# Patient Record
Sex: FEMALE | Race: WHITE | NOT HISPANIC OR LATINO | Employment: FULL TIME | URBAN - METROPOLITAN AREA
[De-identification: names, ages, dates, MRNs, and addresses within clinical notes are randomized per-mention and may not be internally consistent; named-entity substitution may affect disease eponyms.]

---

## 2017-02-20 ENCOUNTER — OFFICE VISIT (OUTPATIENT)
Dept: PALLIATIVE MEDICINE | Facility: CLINIC | Age: 42
End: 2017-02-20
Payer: COMMERCIAL

## 2017-02-20 VITALS
SYSTOLIC BLOOD PRESSURE: 103 MMHG | DIASTOLIC BLOOD PRESSURE: 55 MMHG | WEIGHT: 154 LBS | HEART RATE: 77 BPM | BODY MASS INDEX: 26.85 KG/M2

## 2017-02-20 DIAGNOSIS — M54.2 CERVICALGIA: ICD-10-CM

## 2017-02-20 DIAGNOSIS — G43.719 INTRACTABLE CHRONIC MIGRAINE WITHOUT AURA AND WITHOUT STATUS MIGRAINOSUS: Primary | ICD-10-CM

## 2017-02-20 PROCEDURE — 99204 OFFICE O/P NEW MOD 45 MIN: CPT | Performed by: PSYCHIATRY & NEUROLOGY

## 2017-02-20 RX ORDER — TOPIRAMATE 100 MG/1
150 TABLET, FILM COATED ORAL 2 TIMES DAILY
Qty: 90 TABLET | Refills: 3 | Status: SHIPPED | OUTPATIENT
Start: 2017-02-20 | End: 2017-06-28

## 2017-02-20 RX ORDER — BUTALBITAL, ACETAMINOPHEN AND CAFFEINE 50; 325; 40 MG/1; MG/1; MG/1
1 CAPSULE ORAL EVERY 4 HOURS PRN
Qty: 60 CAPSULE | COMMUNITY
Start: 2017-02-20 | End: 2017-02-23

## 2017-02-20 ASSESSMENT — PAIN SCALES - GENERAL: PAINLEVEL: MILD PAIN (2)

## 2017-02-20 NOTE — PATIENT INSTRUCTIONS
1. Try the compazine as an abortive medication.  This is an antinausea medication.  When you are out and worried about sedation, continue to use your ondansetron (zofran).  When you are able to be more sleepy, try the compazine.  Do not use these together.  Put at least 6 hours between doses.    2. Continue the gabapentin 600mg 3 times daily  3. Increase the topamax as follows:  AM  PM  100mg  150mg (1.5 of the 100mg tabs).  After 1 week, if tolerating, increase to next line  150mg  150mg.  After 2 weeks, call or [employer] with an update.    -remain well hydrated, due to risk of kidney stones  -do not drive until you know how it affects you  -new numbness or tingling in the toes may be related to how well hydrated you are- if this occurs- drink more fluids and it should get better     4. You can continue the fiorecet and the Norco, but the plan would be to try and use less with time.  5. Schedule pain PT and pain psychology-  or [phone]  6. I will look into botox again.  7. Schedule follow up in 2 months, but we will be talking when you call about topamax increase  8. Keep a headache diary- with meds  9. Call [phone] if you need to be scheduled for occipital nerve block (double booked)    Nurse Triage line:  [phone]   Call this number with any questions or concerns. You may leave a detailed message anytime. Calls are typically returned Monday through Friday between 8 AM and 4:30 PM. We usually get back to you within 2 business days depending on the issue/request.       Medication refills:    For non-narcotic medications, call your pharmacy directly to request a refill. The pharmacy will contact the [facility] for authorization. Please allow 3-4 days for these refills to be processed.     For narcotic refills, call the nurse triage line or send a [employer] message. Please contact us 7-10 days before your refill is due. The message MUST include the name of the specific medication(s)  requested and how you would like to receive the prescription(s). The options are as follows:    Pain Clinic staff can mail the prescription to your pharmacy. Please tell us the name of the pharmacy.    You may pick the prescription up at the Pain Clinic (tell us the location) or during a clinic visit with your pain provider    Pain Clinic staff can deliver the prescription to the [place] pharmacy in the clinic building. Please tell us the location.      Scheduling number: [phone].  Call this number to schedule or change appointments.    We believe regular attendance is key to your success in our program.    Any time you are unable to keep your appointment we ask that you call us at least 24 hours in advance to let us know. This will allow us to offer the appointment time to another patient.

## 2017-02-20 NOTE — NURSING NOTE
"    No chief complaint on file.      Initial Wt 69.9 kg (154 lb)  BMI 26.85 kg/m2 Estimated body mass index is 26.85 kg/(m^2) as calculated from the following:    Height as of 8/10/16: 1.613 m (5' 3.5\").    Weight as of this encounter: 69.9 kg (154 lb).  Medication Reconciliation: complete     Marie Hernandez CMA (AAMA)      "

## 2017-02-20 NOTE — PROGRESS NOTES
[facility] Consultation    Date of visit: 2/20/2017    Reason for consultation:    Gayle Castelan is a 41 year old female who is seen in consultation today at the request of her provider, Dr. Chavez    Primary Care Provider is Clinic, [facility].  Pain medications are being prescribed by Dr. Starkey.    Please see the [facility] health questionnaire which the patient completed and reviewed with me in detail.    Chief Complaint:    Chief Complaint   Patient presents with     Pain       Pain history:  Gayle Castelan is a 41 year old female who first started having problems with headaches when she was 17yo. There was no preceding trauma. The patient has seen Dr. Hoskins in the past for one visit for headaches but currently follows up with Dr. Chavez, neurologist.     She has a daily headache, which has improved since starting on gabapentin and topamax, with the topamax being most helpful.   She has a second type of headahe, which became more frequent in 2015 1-2 months after a divorce. She had them about 2x per week. The headaches are in the bilateral temporal regions. They had a sharp, shooting quality with associated nausea, vomiting, photophobia, phonophobia. Certain smells could trigger them as well as bright sunlight and lights. She reports yawning a lot before a headache comes on. When a headache would come on, it could last the entire day and into the next day, sometimes as long as 3-4 days. Since she got put on a steroid taper recently she would have a migraine headache every 2 weeks lasting 2-3 days.      She also has neck pain at times.    Of note, she had a mild concussion in Sept. 2106 after walking into a wall. She presented to ED and reported that she didn't lose consciousness. She had a CT of the head which was negative.    Pain rating: intensity ranges from 0/10 to 8/10, and Averages 4/10 on a 0-10 scale.  Aggravating factors include:  Bright lights, certain scents. Noise and lights.  Relieving factors include: compression headband, ice pack; cool, dark, quiet place; essential oils, physical activity, biofeedback, massage. Deep breathing, stretching.  Any bowel or bladder incontinence: none.  Denied weakness, problems with vision or hearing, numbness/tingling, balance problems, fevers/chills, unintentional weight loss, cognitive problems.        Current treatments include:  Gabapentin 600mg TID  Topamax 100mg BID  Fiorecet- takes 1 at a time. Taking about 2-3/week. Works well to abort headache.    Hydrocodone- last prescribed #30 tabs on 7/27/16- generally will take 1/month for headache    Previous medication treatments included:  Nortriptyline 50mg- not helpful, difficult to wake up  Codeine- nausea  No long-acting narcotics  Advil nh  Aleve NH  Amerge- nervous/shaking  Imitrex- stopped working  Maxalt 5mg PRN  Prednisone burst- helpful for flares- prescribed PRN     Other treatments have included:  Gayle Castelan has not been seen at a pain clinic in the past.   PT: Tried, massage was helpful  Relaxation techniques/biofeedback: none formal  Chiropractor: none  Acupuncture: none  TENs Unit: none  Injections: none    Past Medical History:  No past medical history on file.   Bleeding unrelated to menstrual cycle  depression    Past Surgical History:  Past Surgical History   Procedure Laterality Date     Abdomen surgery       Procedure other - him scan       ESURE Coil     Proctor teeth       Cosmetic mammoplasty augmentation bilateral       Medications:  Current Outpatient Prescriptions   Medication Sig Dispense Refill     Multiple Vitamins-Minerals (MULTIVITAMIN ADULT PO)        butalbital-acetaminophen-caffeine (FIORICET/ESGIC) -40 MG CAPS per capsule Take 1 capsule by mouth every 4 hours as needed for headaches 60 capsule      topiramate (TOPAMAX) 100 MG tablet Take 1.5 tablets (150 mg) by mouth 2 times daily . Increase to this dose as  instructed in clinic. 90 tablet 3     gabapentin (NEURONTIN) 300 MG capsule Take 2 capsules (600 mg) by mouth 3 times daily 180 capsule 5     MAGNESIUM OXIDE PO Take 400 mg by mouth daily       Coenzyme Q10 (CO Q 10 PO) Take 1 tablet by mouth daily       LORazepam (ATIVAN) 1 MG tablet Take 0.5-1 tablets (0.5-1 mg) by mouth every 8 hours as needed for anxiety 30 tablet 1     HYDROcodone-acetaminophen (NORCO) 5-325 MG per tablet 1 to 2 tablets every 4 hours as needed for migraine       HYDROcodone-acetaminophen (NORCO) 5-325 MG per tablet Take 1-2 tablets by mouth every 4 hours as needed for moderate to severe pain 10 tablet 0     topiramate (TOPAMAX) 25 MG tablet Take 8 tablets (200 mg) by mouth 2 times daily Continue to increase this medication by 25mg increments every 1-2 weeks as tolerated up to max of 400mg/day. (Patient taking differently: Take 100 mg by mouth 2 times daily ) 180 tablet 3     Ondansetron HCl (ZOFRAN PO) Take by mouth as needed for nausea or vomiting       Citalopram Hydrobromide (CELEXA PO) Take 20 mg by mouth every evening        albuterol (ALBUTEROL) 108 (90 BASE) MCG/ACT inhaler Inhale 2 puffs into the lungs every 4 hours as needed for shortness of breath / dyspnea 1 Inhaler 0     Allergies:     Allergies   Allergen Reactions     Promethazine      Other reaction(s): Dystonia     Codeine Sulfate Nausea     Social History:  Home situation: has two kids around 10 years old.    Occupation/Schooling: dental hygienist  Tobacco use: none  Alcohol use: none  Drug use: none  History of chemical dependency treatment: none    Family history:  Family History   Problem Relation Age of Onset     Family history unknown: Yes     Family history of headaches: adopted so doesn't know    Review of Systems:  Skin: negative  Eyes: negative  Ears/Nose/Throat: negative  Respiratory: Negative  Cardiovascular: negative  Gastrointestinal: negative  Genitourinary: negative  Musculoskeletal: negative  Neurologic:  headaches  Psychiatric: negative  Hematologic/Lymphatic/Immunologic: negative  Endocrine: negative    Physical Exam:  Vitals:    02/20/17 0849   BP: 103/55   Pulse: 77   Weight: 69.9 kg (154 lb)     Exam:  Constitutional: healthy, alert and no distress  Head: normocephalic. Atraumatic.   Eyes: no redness or jaundice noted   ENT:  Neck supple.    Respiratory: normal respiratory rate  GI: soft  : deferred  Skin: no suspicious lesions or rashes  Psychiatric: mentation appears normal and affect normal/bright    Musculoskeletal exam:  Gait/Station/Posture: normal  Cervical spine:    Flex:  45 degrees   Ext: 45 degrees   Rotation to right: 45 degrees   Rotation to left: 45 degrees    Myofascial tenderness:  no      Neurologic exam:  CN:  Cranial nerves 2-12 are normal  Motor:  5/5 UE and LE strength  Reflexes:     Biceps:     R:  2/4 L: 2/4   Brachioradialis   R:  2/4 L: 2/4   Triceps:  R:  2/4 L: 2/4   Patella:  R:  2/4 L: 2/4   Achilles:  R:  2/4 L: 2/4  Sensory    Diagnostic tests:  CT SCAN OF THE HEAD WITHOUT CONTRAST 9/25/2016       IMPRESSION: Normal CT scan of the head.       Screening tools:  DIRE Score for ongoing opioid management is calculated as follows:    Diagnosis = 1    Intractability =2    Risk: Psych =2  Chem Hlth = 3  Reliability =3  Social = 2    Efficacy = 2    Total DIRE Score = 15 (14 or higher predicts good candidate for ongoing opioid management; 13 or lower predicts poor candidate for opioid management)     Assessment:  1. Chronic migraine headache without aura  2. Psychosocial stressors      Gayle Castelan is a 41 year old female who presents with the complaints of a daily headache and an intermittent one that is most consistent with a migraine headache without an aura. She has taken an active, multimodal approach to dealing with her headaches with topamax and gabepentin being particularly effective at decreasing the frequency of both of her headaches. She does not have any neurologic or system  symptoms to suggest a secondary headache.    Plan:  Diagnosis reviewed, treatment option addressed, and risk/benefits discussed.  Self-care instructions given.  I am recommending a multidisciplinary treatment plan to help this patient better manage her pain.      1. Physical Therapy: Consult for pain PT.  2. Pain Psychologist to address issues of relaxation, behavioral change, coping style, and other factors important to improvement: yes  3. Diagnostic Studies: asked her to keep headache diary  4. Medication Management:   1. Continue gabapentin as currently prescribed  2. Increase topamax to 150 mg BID.   3. Consider compazine as an abortive.  Need to contact patient re: side effects from phenergan.  5. Further procedures recommended:   1. Discussed ONB as an abortive treatment. She can call if she wants occipital nerve block- can be double booked.   2. Consider botox in future.   3. No changes not fiorecet or Norco at this time- can continue to get from original prescriber.  Discussed avoiding overuse, but her use is very limited.  6. Acupuncture: None  7. Urine toxicology screen today: None   8. Recommendations/follow-up for PCP:  none.  9. Release of information: none  10. Follow up: 2 months    I saw and examined the patient with the Pain Fellow/Resident. I have reviewed and agree with the resident's note and plan of care and made changes and corrections directly to the body of the note.    TIME SPENT:  BY FELLOW/RESIDENT ALONE 25 MIN  BY MYSELF AND FELLOW/RESIDENT TOGETHER 25 MIN  BY MYSELF WITHOUT THE FELLOW/RESIDENT 20 MIN    These times included 25 minutes I spent counseling her about her diagnosis and treatment options and coordination of care with the primary team    Tamra Rojas MD  [facility]

## 2017-02-20 NOTE — MR AVS SNAPSHOT
After Visit Summary   2/20/2017    Gayle Castelan    MRN: [id number]           Patient Information     Date Of Birth          1975        Visit Information        Provider Department      2/20/2017 9:00 AM Aurora Rojas MD [facility]        Today's Diagnoses     Intractable chronic migraine without aura and without status migrainosus    -  1      Care Instructions      1. Try the compazine as an abortive medication.  This is an antinausea medication.  When you are out and worried about sedation, continue to use your ondansetron (zofran).  When you are able to be more sleepy, try the compazine.  Do not use these together.  Put at least 6 hours between doses.    2. Continue the gabapentin 600mg 3 times daily  3. Increase the topamax as follows:  AM  PM  100mg  150mg (1.5 of the 100mg tabs).  After 1 week, if tolerating, increase to next line  150mg  150mg.  After 2 weeks, call or mychart with an update.    -remain well hydrated, due to risk of kidney stones  -do not drive until you know how it affects you  -new numbness or tingling in the toes may be related to how well hydrated you are- if this occurs- drink more fluids and it should get better     4. You can continue the fiorecet and the Norco, but the plan would be to try and use less with time.  5. Schedule pain PT and pain psychology-  or [phone]  6. I will look into botox again.  7. Schedule follow up in 2 months, but we will be talking when you call about topamax increase  8. Keep a headache diary- with meds  9. Call [phone] if you need to be scheduled for occipital nerve block (double booked)    Nurse Triage line:  [phone]   Call this number with any questions or concerns. You may leave a detailed message anytime. Calls are typically returned Monday through Friday between 8 AM and 4:30 PM. We usually get back to you within 2 business days depending on the issue/request.       Medication  refills:    For non-narcotic medications, call your pharmacy directly to request a refill. The pharmacy will contact the [facility] for authorization. Please allow 3-4 days for these refills to be processed.     For narcotic refills, call the nurse triage line or send a [employer] message. Please contact us 7-10 days before your refill is due. The message MUST include the name of the specific medication(s) requested and how you would like to receive the prescription(s). The options are as follows:    Pain Clinic staff can mail the prescription to your pharmacy. Please tell us the name of the pharmacy.    You may pick the prescription up at the Pain Clinic (tell us the location) or during a clinic visit with your pain provider    Pain Clinic staff can deliver the prescription to the [place] pharmacy in the clinic building. Please tell us the location.      Scheduling number: [phone].  Call this number to schedule or change appointments.    We believe regular attendance is key to your success in our program.    Any time you are unable to keep your appointment we ask that you call us at least 24 hours in advance to let us know. This will allow us to offer the appointment time to another patient.             Follow-ups after your visit        Your next 10 appointments already scheduled     Feb 23, 2017  9:30 AM CST   [facility] Visit with [facility] Chavez MD   [facility]    [address] [address] [phone]              Who to contact     If you have questions or need follow up information about today's clinic visit or your schedule please contact [facility] directly at [phone].  Normal or non-critical lab and imaging results will be communicated to you by MyChart, letter or phone within 4 business days after the clinic has received the results. If you do not hear from us within 7 days, please contact the clinic through  [employer] or phone. If you have a critical or abnormal lab result, we will notify you by phone as soon as possible.  Submit refill requests through [employer] or call your pharmacy and they will forward the refill request to us. Please allow 3 business days for your refill to be completed.          Additional Information About Your Visit        [employer] Information     [employer] gives you secure access to your electronic health record. If you see a primary care provider, you can also send messages to your care team and make appointments. If you have questions, please call your primary care clinic.  If you do not have a primary care provider, please call [phone] and they will assist you.        Care EveryWhere ID     This is your Care EveryWhere ID. This could be used by other organizations to access your [place] medical records  [id number]        Your Vitals Were     Pulse BMI (Body Mass Index)                77 26.85 kg/m2           Blood Pressure from Last 3 Encounters:   02/20/17 103/55   11/29/16 97/55   09/25/16 104/65    Weight from Last 3 Encounters:   02/20/17 69.9 kg (154 lb)   11/29/16 69.3 kg (152 lb 12.8 oz)   08/10/16 67.6 kg (149 lb)              Today, you had the following     No orders found for display         Today's Medication Changes          These changes are accurate as of: 2/20/17 10:13 AM.  If you have any questions, ask your nurse or doctor.               These medicines have changed or have updated prescriptions.        Dose/Directions    * topiramate 25 MG tablet   Commonly known as:  TOPAMAX   This may have changed:    - how much to take  - additional instructions   Used for:  Migraine without aura and without status migrainosus, not intractable   Changed by:  Gallito Hoskins MD        Dose:  200 mg   Take 8 tablets (200 mg) by mouth 2 times daily Continue to increase this medication by 25mg increments every 1-2 weeks as tolerated up to max of 400mg/day.   Quantity:  180 tablet    Refills:  3       * topiramate 100 MG tablet   Commonly known as:  TOPAMAX   This may have changed:  You were already taking a medication with the same name, and this prescription was added. Make sure you understand how and when to take each.   Used for:  Intractable chronic migraine without aura and without status migrainosus   Changed by:  Aurora Rojas MD        Dose:  150 mg   Take 1.5 tablets (150 mg) by mouth 2 times daily . Increase to this dose as instructed in clinic.   Quantity:  90 tablet   Refills:  3       * Notice:  This list has 2 medication(s) that are the same as other medications prescribed for you. Read the directions carefully, and ask your doctor or other care provider to review them with you.      Stop taking these medicines if you haven't already. Please contact your care team if you have questions.     rizatriptan 5 MG tablet   Commonly known as:  MAXALT   Stopped by:  Aurora Rojas MD                Where to get your medicines      These medications were sent to [place] [address] [place] [address] [address]     Phone:  [phone]     topiramate 100 MG tablet                Primary Care Provider Office Phone # Fax #    [facility] [phone]       [address] [place] [address]        Thank you!     Thank you for choosing [facility]  for your care. Our goal is always to provide you with excellent care. Hearing back from our patients is one way we can continue to improve our services. Please take a few minutes to complete the written survey that you may receive in the mail after your visit with us. Thank you!             Your Updated Medication List - Protect others around you: Learn how to safely use, store and throw away your medicines at www.disposemymeds.org.          This list is accurate as of: 2/20/17 10:13 AM.  Always use your most recent med list.                    Brand Name Dispense Instructions for use    albuterol 108 (90 BASE) MCG/ACT Inhaler    albuterol    1 Inhaler    Inhale 2 puffs into the lungs every 4 hours as needed for shortness of breath / dyspnea       butalbital-acetaminophen-caffeine -40 MG Caps per capsule    FIORICET/ESGIC    60 capsule    Take 1 capsule by mouth every 4 hours as needed for headaches       CELEXA PO      Take 20 mg by mouth every evening       CO Q 10 PO      Take 1 tablet by mouth daily       gabapentin 300 MG capsule    NEURONTIN    180 capsule    Take 2 capsules (600 mg) by mouth 3 times daily       * HYDROcodone-acetaminophen 5-325 MG per tablet    NORCO     1 to 2 tablets every 4 hours as needed for migraine       * HYDROcodone-acetaminophen 5-325 MG per tablet    NORCO    10 tablet    Take 1-2 tablets by mouth every 4 hours as needed for moderate to severe pain       LORazepam 1 MG tablet    ATIVAN    30 tablet    Take 0.5-1 tablets (0.5-1 mg) by mouth every 8 hours as needed for anxiety       MAGNESIUM OXIDE PO      Take 400 mg by mouth daily       MULTIVITAMIN ADULT PO          * topiramate 25 MG tablet    TOPAMAX    180 tablet    Take 8 tablets (200 mg) by mouth 2 times daily Continue to increase this medication by 25mg increments every 1-2 weeks as tolerated up to max of 400mg/day.       * topiramate 100 MG tablet    TOPAMAX    90 tablet    Take 1.5 tablets (150 mg) by mouth 2 times daily . Increase to this dose as instructed in clinic.       ZOFRAN PO      Take by mouth as needed for nausea or vomiting       * Notice:  This list has 4 medication(s) that are the same as other medications prescribed for you. Read the directions carefully, and ask your doctor or other care provider to review them with you.

## 2017-02-23 ENCOUNTER — OFFICE VISIT (OUTPATIENT)
Dept: NEUROLOGY | Facility: CLINIC | Age: 42
End: 2017-02-23
Payer: COMMERCIAL

## 2017-02-23 VITALS — DIASTOLIC BLOOD PRESSURE: 63 MMHG | SYSTOLIC BLOOD PRESSURE: 103 MMHG | TEMPERATURE: 98.1 F | HEART RATE: 77 BPM

## 2017-02-23 DIAGNOSIS — R51.9 CHRONIC DAILY HEADACHE: Primary | ICD-10-CM

## 2017-02-23 DIAGNOSIS — G89.29 OTHER CHRONIC PAIN: ICD-10-CM

## 2017-02-23 DIAGNOSIS — G43.009 MIGRAINE WITHOUT AURA AND WITHOUT STATUS MIGRAINOSUS, NOT INTRACTABLE: ICD-10-CM

## 2017-02-23 PROCEDURE — 99215 OFFICE O/P EST HI 40 MIN: CPT | Performed by: PSYCHIATRY & NEUROLOGY

## 2017-02-23 RX ORDER — HYDROCODONE BITARTRATE AND ACETAMINOPHEN 5; 325 MG/1; MG/1
1-2 TABLET ORAL EVERY 4 HOURS PRN
Qty: 10 TABLET | Refills: 0 | Status: SHIPPED | OUTPATIENT
Start: 2017-02-23

## 2017-02-23 RX ORDER — BUTALBITAL, ACETAMINOPHEN AND CAFFEINE 50; 325; 40 MG/1; MG/1; MG/1
1 CAPSULE ORAL EVERY 4 HOURS PRN
Qty: 60 CAPSULE | Refills: 0 | Status: SHIPPED | OUTPATIENT
Start: 2017-02-23 | End: 2017-09-18

## 2017-02-23 ASSESSMENT — PAIN SCALES - GENERAL: PAINLEVEL: MILD PAIN (3)

## 2017-02-23 NOTE — MR AVS SNAPSHOT
After Visit Summary   2/23/2017    Gayle Castelan    MRN: [id number]           Patient Information     Date Of Birth          1975        Visit Information        Provider Department      2/23/2017 9:30 AM Dorothy Chavez MD [facility]        Today's Diagnoses     Other chronic pain    -  1    Chronic daily headache        Migraine without aura and without status migrainosus, not intractable          Care Instructions    Plan:    I agree with Dr. Rojas's plan. Hopefully your headaches will improve with the increased Topamax and Botox.  Check with her office regarding the compazine. Use this as a substitute for Norco/Fioricet. Our goal is to minimize these medications, as discussed.  Return to clinic in 6 months, or sooner if new concerns arise.           Follow-ups after your visit        Who to contact     If you have questions or need follow up information about today's clinic visit or your schedule please contact [facility] directly at [phone].  Normal or non-critical lab and imaging results will be communicated to you by [employer], letter or phone within 4 business days after the clinic has received the results. If you do not hear from us within 7 days, please contact the clinic through [employer] or phone. If you have a critical or abnormal lab result, we will notify you by phone as soon as possible.  Submit refill requests through [employer] or call your pharmacy and they will forward the refill request to us. Please allow 3 business days for your refill to be completed.          Additional Information About Your Visit        MyChart Information     [employer] gives you secure access to your electronic health record. If you see a primary care provider, you can also send messages to your care team and make appointments. If you have questions, please call your primary care clinic.  If you do not have a primary care provider, please call [phone] and they will  assist you.        Care EveryWhere ID     This is your Care EveryWhere ID. This could be used by other organizations to access your [facility] medical records  [id number]        Your Vitals Were     Pulse Temperature Last Period Breastfeeding?          77 98.1  F (36.7  C) (Oral) (LMP Unknown) No         Blood Pressure from Last 3 Encounters:   02/23/17 103/63   02/20/17 103/55   11/29/16 97/55    Weight from Last 3 Encounters:   02/20/17 154 lb (69.9 kg)   11/29/16 152 lb 12.8 oz (69.3 kg)   08/10/16 149 lb (67.6 kg)              Today, you had the following     No orders found for display         Today's Medication Changes          These changes are accurate as of: 2/23/17 10:10 AM.  If you have any questions, ask your nurse or doctor.               These medicines have changed or have updated prescriptions.        Dose/Directions    topiramate 100 MG tablet   Commonly known as:  TOPAMAX   This may have changed:  Another medication with the same name was removed. Continue taking this medication, and follow the directions you see here.   Used for:  Intractable chronic migraine without aura and without status migrainosus   Changed by:  Aurora Rojas MD        Dose:  150 mg   Take 1.5 tablets (150 mg) by mouth 2 times daily . Increase to this dose as instructed in clinic.   Quantity:  90 tablet   Refills:  3            Where to get your medicines      Some of these will need a paper prescription and others can be bought over the counter.  Ask your nurse if you have questions.     Bring a paper prescription for each of these medications     butalbital-acetaminophen-caffeine -40 MG Caps per capsule    HYDROcodone-acetaminophen 5-325 MG per tablet                Primary Care Provider Office Phone # Fax #    [facility]hash [facility] [phone]       [address] [place] [id number]        Thank you!     Thank you for choosing [facility]  for your care. Our goal is  always to provide you with excellent care. Hearing back from our patients is one way we can continue to improve our services. Please take a few minutes to complete the written survey that you may receive in the mail after your visit with us. Thank you!             Your Updated Medication List - Protect others around you: Learn how to safely use, store and throw away your medicines at www.disposemymeds.org.          This list is accurate as of: 2/23/17 10:10 AM.  Always use your most recent med list.                   Brand Name Dispense Instructions for use    albuterol 108 (90 BASE) MCG/ACT Inhaler    albuterol    1 Inhaler    Inhale 2 puffs into the lungs every 4 hours as needed for shortness of breath / dyspnea       butalbital-acetaminophen-caffeine -40 MG Caps per capsule    FIORICET/ESGIC    60 capsule    Take 1 capsule by mouth every 4 hours as needed for headaches       CELEXA PO      Take 20 mg by mouth every evening       CO Q 10 PO      Take 1 tablet by mouth daily       gabapentin 300 MG capsule    NEURONTIN    180 capsule    Take 2 capsules (600 mg) by mouth 3 times daily       * HYDROcodone-acetaminophen 5-325 MG per tablet    NORCO     1 to 2 tablets every 4 hours as needed for migraine       * HYDROcodone-acetaminophen 5-325 MG per tablet    NORCO    10 tablet    Take 1-2 tablets by mouth every 4 hours as needed for moderate to severe pain       LORazepam 1 MG tablet    ATIVAN    30 tablet    Take 0.5-1 tablets (0.5-1 mg) by mouth every 8 hours as needed for anxiety       MAGNESIUM OXIDE PO      Take 400 mg by mouth daily       MULTIVITAMIN ADULT PO          topiramate 100 MG tablet    TOPAMAX    90 tablet    Take 1.5 tablets (150 mg) by mouth 2 times daily . Increase to this dose as instructed in clinic.       ZOFRAN PO      Take by mouth as needed for nausea or vomiting       * Notice:  This list has 2 medication(s) that are the same as other medications prescribed for you. Read the  directions carefully, and ask your doctor or other care provider to review them with you.

## 2017-02-23 NOTE — PROGRESS NOTES
ESTABLISHED PATIENT NEUROLOGY NOTE    DATE OF VISIT: 2/23/2017  MRN: [id number]  PATIENT NAME: Gayle Castelan  YOB: 1975    Chief Complaint   Patient presents with     RECHECK     Migraines.     SUBJECTIVE:                                                      HISTORY OF PRESENT ILLNESS:  Gayle is here for follow up regarding headaches. She has a history of chronic daily headache and migraines. SHe has responded modestly to Topamax and gabapentin. Because her headaches have been difficult to treat, I referred Gayle to the Pain clinic and she recently met with Dr. Rojas.    Per Dr. Rojas's note dated 2.20.17:  Assessment:     Gayle Castelan is a 41 year old female who presents with the complaints of a daily headache and an intermittent one that is most consistent with a migraine headache without an aura. She has taken an active, multimodal approach to dealing with her headaches with topamax and gabepentin being particularly effective at decreasing the frequency of both of her headaches. She does not have any neurologic or system symptoms to suggest a secondary headache.     Plan:  Diagnosis reviewed, treatment option addressed, and risk/benefits discussed. Self-care instructions given. I am recommending a multidisciplinary treatment plan to help this patient better manage her pain.      1. Physical Therapy: Consult for pain PT.  2. Pain Psychologist to address issues of relaxation, behavioral change, coping style, and other factors important to improvement: Consult placed.  3. Introductory groups ordered none  4. Diagnostic Studies: asked her to keep headache diary  5. Medication Management: Continue gabapentin as currently prescribed but increase topamax to 150 mg BID. Start compazine as an abortive  6. Further procedures recommended: She can call if she wants occipital nerve block. To consider botox in future.   7. Acupuncture: None  8. Urine toxicology screen today: None   9. Recommendations/follow-up  for PCP:  Will defer to prescribing physician regarding fioricet and Norco.  10. Release of information: none. Follow up: 2 mo      Today Gayle tells me that her daily headaches continue to be variable in character and intensity. She does not have any new symptoms. The migraines seem to be well-controlled on the Topamax and gabapentin. She does continue to notice stress as a trigger for her headaches. She has not had a severe migraine-type headache for about 3 months. She asks about the compazine prescribed by Dr. Rojas. She went to the pharmacy to  the prescription but it was not there. I do not see an order. She tells me that she continues to use Norco and Fioricet for headaches, not exceeding twice per week but lately less than that. She does feel that these are effective for her headaches. She has a trip to [facility] planned for next week and she is nervous about managing her headaches in that setting. She is excited about the possibility of Botox and for now is tolerating the recent increase in Topamax without difficulty.     No new concerns otherwise.     CURRENT MEDICATIONS:     Current Outpatient Prescriptions on File Prior to Visit:  Multiple Vitamins-Minerals (MULTIVITAMIN ADULT PO)    topiramate (TOPAMAX) 100 MG tablet Take 1.5 tablets (150 mg) by mouth 2 times daily . Increase to this dose as instructed in clinic.   gabapentin (NEURONTIN) 300 MG capsule Take 2 capsules (600 mg) by mouth 3 times daily   MAGNESIUM OXIDE PO Take 400 mg by mouth daily   Coenzyme Q10 (CO Q 10 PO) Take 1 tablet by mouth daily   LORazepam (ATIVAN) 1 MG tablet Take 0.5-1 tablets (0.5-1 mg) by mouth every 8 hours as needed for anxiety   HYDROcodone-acetaminophen (NORCO) 5-325 MG per tablet 1 to 2 tablets every 4 hours as needed for migraine   Ondansetron HCl (ZOFRAN PO) Take by mouth as needed for nausea or vomiting   Citalopram Hydrobromide (CELEXA PO) Take 20 mg by mouth every evening    albuterol (ALBUTEROL) 108 (90  BASE) MCG/ACT inhaler Inhale 2 puffs into the lungs every 4 hours as needed for shortness of breath / dyspnea   [DISCONTINUED] topiramate (TOPAMAX) 25 MG tablet Take 8 tablets (200 mg) by mouth 2 times daily Continue to increase this medication by 25mg increments every 1-2 weeks as tolerated up to max of 400mg/day. (Patient taking differently: Take 100 mg by mouth 2 times daily )     No current facility-administered medications on file prior to visit.     RECENT DIAGNOSTIC STUDIES:   Labs:   Results for orders placed or performed in visit on 11/29/16   CBC with platelets   Result Value Ref Range    WBC 6.2 4.0 - 11.0 10e9/L    RBC Count 4.75 3.8 - 5.2 10e12/L    Hemoglobin 10.9 (L) 11.7 - 15.7 g/dL    Hematocrit 35.6 35.0 - 47.0 %    MCV 75 (L) 78 - 100 fl    MCH 22.9 (L) 26.5 - 33.0 pg    MCHC 30.6 (L) 31.5 - 36.5 g/dL    RDW 18.1 (H) 10.0 - 15.0 %    Platelet Count 414 150 - 450 10e9/L   Basic metabolic panel   Result Value Ref Range    Sodium 140 133 - 144 mmol/L    Potassium 4.0 3.4 - 5.3 mmol/L    Chloride 110 (H) 94 - 109 mmol/L    Carbon Dioxide 23 20 - 32 mmol/L    Anion Gap 7 3 - 14 mmol/L    Glucose 81 70 - 99 mg/dL    Urea Nitrogen 8 7 - 30 mg/dL    Creatinine 0.83 0.52 - 1.04 mg/dL    GFR Estimate 76 >60 mL/min/1.7m2    GFR Estimate If Black >90   GFR Calc   >60 mL/min/1.7m2    Calcium 8.4 (L) 8.5 - 10.1 mg/dL       REVIEW OF SYSTEMS:                                                      10-point review of systems is negative except as mentioned above in HPI.     EXAM:                                                      Physical Exam:   Vitals: /63 (BP Location: Left arm, Patient Position: Chair, Cuff Size: Adult Regular)  Pulse 77  Temp 98.1  F (36.7  C) (Oral)  LMP  (LMP Unknown)  Breastfeeding? No  BMI= There is no height or weight on file to calculate BMI.  GENERAL: NAD.   HEENT: Mild TTP of posterior neck.  Focused Neurologic:  MENTAL STATUS: Alert, attentive. Speech is fluent.  Normal comprehension. Normal concentration. Adequate fund of knowledge.   CRANIAL NERVES: Discs flat. Visual fields intact to confrontation. Pupils equally, round and reactive to light. Facial sensation and movement normal. EOM full. Hearing intact to conversation. Sternocleidomastoids and trapezius strength intact. Palate moves symmetrically. Tongue midline.  MOTOR: 5/5 in proximal and distal muscle groups of upper and lower extremities with brief testing. Tone and bulk normal.   DTRs: Intact and symmetric. Babinski down going.   SENSATION: Normal light touch throughout.   COORDINATION: Finger tapping normal.  STATION AND GAIT: Gait is normal.  CV: RRR. S1, S2.   NECK: No bruits.      ASSESSMENT and PLAN:                                                      Assessment and Plan:    ICD-10-CM    1. Chronic daily headache R51 butalbital-acetaminophen-caffeine (FIORICET/ESGIC) -40 MG CAPS per capsule   2. Migraine without aura and without status migrainosus, not intractable G43.009 butalbital-acetaminophen-caffeine (FIORICET/ESGIC) -40 MG CAPS per capsule   3. Other chronic pain G89.29 HYDROcodone-acetaminophen (NORCO) 5-325 MG per tablet       Ms. Castelan is a pleasant 40 yo woman with difficult-to-treat headaches. She is now following with Dr. Rojas in Pain Clinic. They seem to have a good plan for treating her headaches moving forward. I agreed to provide a prescription for the Norco and Fioricet as the patient does seem to be using these responsibly and she has an upcoming trip for which she is concerned about headache coverage. We discussed trying the compazine instead of these. I again explained to Gayle that regular use of narcotics and barbituates can be dangerous and that is why we feel so strongly about trying to avoid these in the long-term. The goal is to achieve better headache control so that she does not require any abortive treatment. Patient understands and agrees with the plan.     Patient  Instructions:  I agree with Dr. Rojas's plan. Hopefully your headaches will improve with the increased Topamax and Botox.  Check with her office regarding the compazine. Use this as a substitute for Norco/Fioricet. Our goal is to minimize these medications, as discussed.  Return to clinic in 6 months, or sooner if new concerns arise.     Total Time: 40 minutes were spent with the patient. More than 50% of the time spent on counseling (as described above in Assessment and Plan)/coordinating the care.    Dorothy Chavez MD  Neurology

## 2017-02-23 NOTE — PATIENT INSTRUCTIONS
Plan:    I agree with Dr. Rojas's plan. Hopefully your headaches will improve with the increased Topamax and Botox.  Check with her office regarding the compazine. Use this as a substitute for Norco/Fioricet. Our goal is to minimize these medications, as discussed.  Return to clinic in 6 months, or sooner if new concerns arise.

## 2017-02-23 NOTE — NURSING NOTE
"Chief Complaint   Patient presents with     RECHECK     Migraines.       Initial /63 (BP Location: Left arm, Patient Position: Chair, Cuff Size: Adult Regular)  Pulse 77  Temp 98.1  F (36.7  C) (Oral)  LMP  (LMP Unknown)  Breastfeeding? No Estimated body mass index is 26.85 kg/(m^2) as calculated from the following:    Height as of 8/10/16: 5' 3.5\" (1.613 m).    Weight as of 2/20/17: 154 lb (69.9 kg).  Medication Reconciliation: complete    Patient prefers to be contacted: Kaleb Hunt to leave detailed message on voicemail: n/a    Domonique BOSS-CMA    "

## 2017-02-24 ENCOUNTER — TELEPHONE (OUTPATIENT)
Dept: PALLIATIVE MEDICINE | Facility: CLINIC | Age: 42
End: 2017-02-24

## 2017-02-24 ENCOUNTER — MYC MEDICAL ADVICE (OUTPATIENT)
Dept: PALLIATIVE MEDICINE | Facility: CLINIC | Age: 42
End: 2017-02-24

## 2017-02-25 NOTE — TELEPHONE ENCOUNTER
Gayle,  I did not put in the order for compazine, as I saw that you had a previous reaction to phenergan, which is a similar medication.  Could you tell me more about that reaction?  I will review this again on Monday.    Tamra Rojas MD  [facility]

## 2017-02-27 NOTE — TELEPHONE ENCOUNTER
My chart message from pt:    Oh yes, I am perfectly fine with that! Thanks.   Gayle     Sent to provider.     Praveena Coon RN, [facility]  Pain Clinic Care Coordinator

## 2017-02-27 NOTE — TELEPHONE ENCOUNTER
Per patient [employer] message:    Created: 2/27/2017 10:43 AM      ,  With the phenergan, I was given this while in the ER for a migraine attack and experienced severe abnormal uncontrolled and painful muscle movements mainly in my legs, but also in my arms and my neck and slight head movement. This reaction was quite scary and unnerving. I'd like very much to avoid this complication if possible. Thanks.  Gayle Castelan        Routed to Dr. Rojas to review.    Deanna Jacob RN-BSN  [facility]

## 2017-02-27 NOTE — TELEPHONE ENCOUNTER
Pt has not reviewed the initial [employer] message.   [email or web address] message resent.    Deanna Jacob RN-BSN  [facility]

## 2017-06-01 ENCOUNTER — HOSPITAL ENCOUNTER (EMERGENCY)
Facility: CLINIC | Age: 42
Discharge: HOME | End: 2017-06-01
Attending: EMERGENCY MEDICINE | Admitting: EMERGENCY MEDICINE
Payer: COMMERCIAL

## 2017-06-01 ENCOUNTER — HOSPITAL ENCOUNTER (OUTPATIENT)
Dept: LAB | Facility: CLINIC | Age: 42
Discharge: HOME | End: 2017-06-01
Attending: EMERGENCY MEDICINE | Admitting: EMERGENCY MEDICINE
Payer: COMMERCIAL

## 2017-06-01 VITALS
TEMPERATURE: 98 F | WEIGHT: 145 LBS | OXYGEN SATURATION: 100 % | SYSTOLIC BLOOD PRESSURE: 96 MMHG | RESPIRATION RATE: 16 BRPM | HEIGHT: 63 IN | BODY MASS INDEX: 25.69 KG/M2 | DIASTOLIC BLOOD PRESSURE: 59 MMHG

## 2017-06-01 DIAGNOSIS — E86.0 DEHYDRATION: ICD-10-CM

## 2017-06-01 DIAGNOSIS — K52.9 GASTROENTERITIS: ICD-10-CM

## 2017-06-01 LAB
ALBUMIN SERPL-MCNC: 3.9 G/DL (ref 3.4–5)
ALP SERPL-CCNC: 87 U/L (ref 40–150)
ALT SERPL W P-5'-P-CCNC: 19 U/L (ref 0–50)
ANION GAP SERPL CALCULATED.3IONS-SCNC: 8 MMOL/L (ref 3–14)
AST SERPL W P-5'-P-CCNC: 22 U/L (ref 0–45)
BASOPHILS # BLD AUTO: 0 10E9/L (ref 0–0.2)
BASOPHILS NFR BLD AUTO: 0.5 %
BILIRUB SERPL-MCNC: 0.2 MG/DL (ref 0.2–1.3)
BUN SERPL-MCNC: 7 MG/DL (ref 7–30)
C DIFF TOX B STL QL: NORMAL
CALCIUM SERPL-MCNC: 8.2 MG/DL (ref 8.5–10.1)
CHLORIDE SERPL-SCNC: 113 MMOL/L (ref 94–109)
CO2 SERPL-SCNC: 18 MMOL/L (ref 20–32)
CREAT SERPL-MCNC: 0.71 MG/DL (ref 0.52–1.04)
DIFFERENTIAL METHOD BLD: ABNORMAL
EOSINOPHIL # BLD AUTO: 0.3 10E9/L (ref 0–0.7)
EOSINOPHIL NFR BLD AUTO: 3.7 %
ERYTHROCYTE [DISTWIDTH] IN BLOOD BY AUTOMATED COUNT: 17.8 % (ref 10–15)
GFR SERPL CREATININE-BSD FRML MDRD: ABNORMAL ML/MIN/1.7M2
GLUCOSE SERPL-MCNC: 75 MG/DL (ref 70–99)
HCT VFR BLD AUTO: 33.4 % (ref 35–47)
HGB BLD-MCNC: 10.2 G/DL (ref 11.7–15.7)
IMM GRANULOCYTES # BLD: 0 10E9/L (ref 0–0.4)
IMM GRANULOCYTES NFR BLD: 0.2 %
LYMPHOCYTES # BLD AUTO: 1.7 10E9/L (ref 0.8–5.3)
LYMPHOCYTES NFR BLD AUTO: 19.7 %
MCH RBC QN AUTO: 22.2 PG (ref 26.5–33)
MCHC RBC AUTO-ENTMCNC: 30.5 G/DL (ref 31.5–36.5)
MCV RBC AUTO: 73 FL (ref 78–100)
MONOCYTES # BLD AUTO: 0.7 10E9/L (ref 0–1.3)
MONOCYTES NFR BLD AUTO: 7.9 %
NEUTROPHILS # BLD AUTO: 5.7 10E9/L (ref 1.6–8.3)
NEUTROPHILS NFR BLD AUTO: 68 %
PLATELET # BLD AUTO: 369 10E9/L (ref 150–450)
POTASSIUM SERPL-SCNC: 3.2 MMOL/L (ref 3.4–5.3)
PROT SERPL-MCNC: 6.9 G/DL (ref 6.8–8.8)
RBC # BLD AUTO: 4.6 10E12/L (ref 3.8–5.2)
SODIUM SERPL-SCNC: 139 MMOL/L (ref 133–144)
SPECIMEN SOURCE: NORMAL
WBC # BLD AUTO: 8.4 10E9/L (ref 4–11)

## 2017-06-01 PROCEDURE — 85025 COMPLETE CBC W/AUTO DIFF WBC: CPT | Performed by: EMERGENCY MEDICINE

## 2017-06-01 PROCEDURE — 87506 IADNA-DNA/RNA PROBE TQ 6-11: CPT | Performed by: EMERGENCY MEDICINE

## 2017-06-01 PROCEDURE — 25000128 H RX IP 250 OP 636: Performed by: EMERGENCY MEDICINE

## 2017-06-01 PROCEDURE — 87493 C DIFF AMPLIFIED PROBE: CPT | Performed by: EMERGENCY MEDICINE

## 2017-06-01 PROCEDURE — 99284 EMERGENCY DEPT VISIT MOD MDM: CPT | Mod: 25

## 2017-06-01 PROCEDURE — 96360 HYDRATION IV INFUSION INIT: CPT

## 2017-06-01 PROCEDURE — 80053 COMPREHEN METABOLIC PANEL: CPT | Performed by: EMERGENCY MEDICINE

## 2017-06-01 PROCEDURE — 99284 EMERGENCY DEPT VISIT MOD MDM: CPT | Performed by: EMERGENCY MEDICINE

## 2017-06-01 RX ADMIN — SODIUM CHLORIDE, POTASSIUM CHLORIDE, SODIUM LACTATE AND CALCIUM CHLORIDE 1000 ML: 600; 310; 30; 20 INJECTION, SOLUTION INTRAVENOUS at 15:04

## 2017-06-01 NOTE — ED PROVIDER NOTES
History     Chief Complaint   Patient presents with     Abdominal Pain     diarrhea for 6 days     Diarrhea     HPI     Gayle Castelan is a 41 year old female who presents to the ED today for evaluation of a 6 day history of diarrhea. Patient states 6 days ago she developed a sudden onset of watery diarrhea and mild abdominal cramping. She reports 4 episodes of diarrhea every hour. Denies blood in the stool. She admits to a decreased appetite but has adequate fluid intake. She has tried taking imodium and Pepto bismol with no relief. The patient presents to the ED today with concerns of an electrolyte imbalance as she has been feeling numbness in her lips and toes. She denies any recent camping trips or travels outside of the state or country. No recent antibiotic use. Denies a fever and vomiting. No history of inflammatory bowel disease. Several years ago the patient received a colonoscopy that showed no significant findings.     I have reviewed the Medications, Allergies, Past Medical and Surgical History, and Social History in the Epic system.    History reviewed. No pertinent past medical history.    Past Surgical History:   Procedure Laterality Date     ABDOMEN SURGERY       COSMETIC MAMMOPLASTY AUGMENTATION BILATERAL       PROCEDURE OTHER - HIM SCAN      ESURE Coil     wisdom teeth         Current Outpatient Prescriptions   Medication Sig Dispense Refill     topiramate (TOPAMAX) 100 MG tablet Take 1.5 tablets (150 mg) by mouth 2 times daily . Increase to this dose as instructed in clinic. 90 tablet 3     gabapentin (NEURONTIN) 300 MG capsule Take 2 capsules (600 mg) by mouth 3 times daily 180 capsule 5     MAGNESIUM OXIDE PO Take 400 mg by mouth daily       Citalopram Hydrobromide (CELEXA PO) Take 20 mg by mouth every evening        HYDROcodone-acetaminophen (NORCO) 5-325 MG per tablet Take 1-2 tablets by mouth every 4 hours as needed for moderate to severe pain 10 tablet 0      "butalbital-acetaminophen-caffeine (FIORICET/ESGIC) -40 MG CAPS per capsule Take 1 capsule by mouth every 4 hours as needed for headaches 60 capsule 0     Multiple Vitamins-Minerals (MULTIVITAMIN ADULT PO)        Coenzyme Q10 (CO Q 10 PO) Take 1 tablet by mouth daily       LORazepam (ATIVAN) 1 MG tablet Take 0.5-1 tablets (0.5-1 mg) by mouth every 8 hours as needed for anxiety 30 tablet 1     HYDROcodone-acetaminophen (NORCO) 5-325 MG per tablet 1 to 2 tablets every 4 hours as needed for migraine       Ondansetron HCl (ZOFRAN PO) Take by mouth as needed for nausea or vomiting       albuterol (ALBUTEROL) 108 (90 BASE) MCG/ACT inhaler Inhale 2 puffs into the lungs every 4 hours as needed for shortness of breath / dyspnea 1 Inhaler 0          Allergies   Allergen Reactions     Promethazine      Other reaction(s): Dystonia     Codeine Sulfate Nausea       Social History   Substance Use Topics     Smoking status: Never Smoker     Smokeless tobacco: Never Used     Alcohol use No       Review of Systems  As mentioned above in the history present illness. All other systems were reviewed and are negative.    Physical Exam   BP: 124/85  Heart Rate: 72  Temp: 98  F (36.7  C)  Resp: 16  Height: 160 cm (5' 3\")  Weight: 65.8 kg (145 lb)  SpO2: 100 %  Physical Exam   Constitutional: She is oriented to person, place, and time. She appears well-developed and well-nourished. No distress.   HENT:   Head: Normocephalic and atraumatic.   Oral mucosa dry.   Eyes: Conjunctivae and EOM are normal. No scleral icterus.   Neck: Normal range of motion. Neck supple.   Cardiovascular: Normal rate, regular rhythm and normal heart sounds.  Exam reveals no gallop and no friction rub.    No murmur heard.  Pulmonary/Chest: Effort normal and breath sounds normal. No respiratory distress. She has no wheezes. She has no rales.   Abdominal: Soft. Bowel sounds are normal. She exhibits no distension. There is no tenderness. There is no rebound and no " guarding.   Musculoskeletal: Normal range of motion. She exhibits no edema.   Neurological: She is alert and oriented to person, place, and time.   Skin: Skin is warm and dry. No rash noted. She is not diaphoretic. No erythema. No pallor.   Psychiatric: She has a normal mood and affect. Her behavior is normal.   Nursing note and vitals reviewed.      ED Course     ED Course     Procedures        Results for orders placed or performed during the hospital encounter of 06/01/17   CBC with platelets, differential   Result Value Ref Range    WBC 8.4 4.0 - 11.0 10e9/L    RBC Count 4.60 3.8 - 5.2 10e12/L    Hemoglobin 10.2 (L) 11.7 - 15.7 g/dL    Hematocrit 33.4 (L) 35.0 - 47.0 %    MCV 73 (L) 78 - 100 fl    MCH 22.2 (L) 26.5 - 33.0 pg    MCHC 30.5 (L) 31.5 - 36.5 g/dL    RDW 17.8 (H) 10.0 - 15.0 %    Platelet Count 369 150 - 450 10e9/L    Diff Method Automated Method     % Neutrophils 68.0 %    % Lymphocytes 19.7 %    % Monocytes 7.9 %    % Eosinophils 3.7 %    % Basophils 0.5 %    % Immature Granulocytes 0.2 %    Absolute Neutrophil 5.7 1.6 - 8.3 10e9/L    Absolute Lymphocytes 1.7 0.8 - 5.3 10e9/L    Absolute Monocytes 0.7 0.0 - 1.3 10e9/L    Absolute Eosinophils 0.3 0.0 - 0.7 10e9/L    Absolute Basophils 0.0 0.0 - 0.2 10e9/L    Abs Immature Granulocytes 0.0 0 - 0.4 10e9/L   Comprehensive metabolic panel   Result Value Ref Range    Sodium 139 133 - 144 mmol/L    Potassium 3.2 (L) 3.4 - 5.3 mmol/L    Chloride 113 (H) 94 - 109 mmol/L    Carbon Dioxide 18 (L) 20 - 32 mmol/L    Anion Gap 8 3 - 14 mmol/L    Glucose 75 70 - 99 mg/dL    Urea Nitrogen 7 7 - 30 mg/dL    Creatinine 0.71 0.52 - 1.04 mg/dL    GFR Estimate >90  Non  GFR Calc   >60 mL/min/1.7m2    GFR Estimate If Black >90   GFR Calc   >60 mL/min/1.7m2    Calcium 8.2 (L) 8.5 - 10.1 mg/dL    Bilirubin Total 0.2 0.2 - 1.3 mg/dL    Albumin 3.9 3.4 - 5.0 g/dL    Protein Total 6.9 6.8 - 8.8 g/dL    Alkaline Phosphatase 87 40 - 150 U/L    ALT  19 0 - 50 U/L    AST 22 0 - 45 U/L          Medications   lactated ringers BOLUS 1,000 mL (0 mLs Intravenous Stopped 6/1/17 1607)     4:05 PM - Feels much improved after IV fluid bolus.  Comfortable with discharge home.  Unable to provide stool specimen in the ED prior to discharge.  Will discharge home with stool collection containers.      Assessments & Plan (with Medical Decision Making)   One week of gastroenteritis with development of dehydration. Abdomen is benign and nonsurgical.  Do not feel that abdominal imaging evaluation is currently indicated or would be helpful.  Doubt emergent GI/ disease process.  Suspect viral gastroenteritis.  She felt much improved after IV fluids.  Unable to provide specimen in the ED prior to discharge.  Discharged home with stool collection containers and instructions for supportive care.  Primary care clinic follow-up if symptoms not resolving over the next several days. Patient was provided instructions for supportive care and will return as needed for worsened condition or worsening symptoms, or new problems or concerns.      I have reviewed the nursing notes.    I have reviewed the findings, diagnosis, plan and need for follow up with the patient.    Discharge Medication List as of 6/1/2017  4:07 PM          Final diagnoses:   Gastroenteritis   Dehydration     This document serves as a record of the services and decisions personally performed and made by Angelo Garcia MD. It was created on HIS/HER behalf by Diann Adams, a trained medical scribe. The creation of this document is based the provider's statements to the medical scribe.  Diann Adams 2:15 PM 6/1/2017    Provider:   The information in this document, created by the medical scribe for me, accurately reflects the services I personally performed and the decisions made by me. I have reviewed and approved this document for accuracy prior to leaving the patient care area.  Angelo Garcia MD 2:15 PM  6/1/2017 6/1/2017   [facility] EMERGENCY DEPARTMENT     Angelo Garcia MD  06/05/17 0822

## 2017-06-01 NOTE — ED NOTES
Diarrhea x 6 days, water pea soup consistency with abdominal cramping.   Pt reports decreased appetite and drinking water with no difficulty.

## 2017-06-01 NOTE — ED AVS SNAPSHOT
[facility] Emergency Department    [address] [address]    Phone:  [phone]    Fax:  [phone]                                       Gayle Castelan   MRN: [id number]    Department:  [facility] Emergency Department   Date of Visit:  6/1/2017           After Visit Summary Signature Page     I have received my discharge instructions, and my questions have been answered. I have discussed any challenges I see with this plan with the nurse or doctor.    ..........................................................................................................................................  Patient/Patient Representative Signature      ..........................................................................................................................................  Patient Representative Print Name and Relationship to Patient    ..................................................               ................................................  Date                                            Time    ..........................................................................................................................................  Reviewed by Signature/Title    ...................................................              ..............................................  Date                                                            Time

## 2017-06-01 NOTE — ED AVS SNAPSHOT
[facility] Emergency Department    [address] [facility] [address]    Phone:  [phone]    Fax:  [phone]                                       Gayle Castelan   MRN: [id number]    Department:  [facility] Emergency Department   Date of Visit:  6/1/2017           Patient Information     Date Of Birth          1975        Your diagnoses for this visit were:     Gastroenteritis     Dehydration        You were seen by Angelo Garcia MD.      Follow-up Information     Follow up with Clinic, [facility] In 4 days.    Why:  For re-evaluation if symptoms not resolving    Contact information:    [facility] [address]  [phone]          Discharge Instructions         Anatomy of the Digestive System  Food gives the body the energy needed for life. The digestive system breaks food down into basic nutrients that can be used by the body. The digestive tract is a long, muscular tube that extends from the mouth through the stomach and intestines to the anus. As food moves along the digestive tract, it is digested (changed into substances that can be absorbed into the bloodstream). Certain organs (such as the liver, gallbladder, and pancreas) help with this digestion. Parts of food that cannot be digested are turned into stool, which is waste material that is passed out of the body.  Digestive system      The mouth takes in food, breaks it into pieces, and begins the process of digestion.    The esophagus moves food from the mouth to the stomach.    The stomach breaks food down into a liquid mixture.    The liver makes bile that helps digest fat.    The gallbladder stores bile.    The pancreas makes enzymes that help in digestion.    The small intestine digests food further and absorbs nutrients. What is left is passed on to the colon as liquid waste.    The large intestine (colon) absorbs water, salt, and minerals from the waste, forming a solid stool.    The rectum  stores stool until a bowel movement occurs.    The anus is the opening where stool leaves the body.    5650-1119 [employer]. [address] [place] [address], [place] [address]. All rights reserved. This information is not intended as a substitute for professional medical care. Always follow your healthcare professional's instructions.          Uncertain Causes of Diarrhea (Adult)    Diarrhea is when stools are loose and watery. This can be caused by:    Viral infections    Bacterial infections    Food poisoning    Parasites    Irritable bowel syndrome (IBS)    Inflammatory bowel diseases such as ulcerative colitis, Crohn's disease, and celiac disease    Food intolerance, such as to lactose, the sugar found in milk and milk products    Reaction to medicines like antibiotics, laxatives, cancer drugs, and antacids  Along with diarrhea, you may also have:    Abdominal pain and cramping    Nausea and vomiting    Loss of bowel control    Fever and chills    Bloody stools  In some cases, antibiotics may help to treat diarrhea. You may have a stool sample test. This is done to see what is causing your diarrhea, and if antibiotics will help treat it. The results of a stool sample test may take up to 2 days. The healthcare provider may not give you antibiotics until he or she has the stool test results.  Diarrhea can cause dehydration. This is the loss of too much water and other fluids from the body. When this occurs, body fluid must be replaced. This can be done with oral rehydration solutions. Oral rehydration solutions are available at drugstores and grocery stores without a prescription.  Home care  Follow all instructions given by your healthcare provider. Rest at home for the next 24 hours, or until you feel better. Avoid caffeine, tobacco, and alcohol. These can make diarrhea, cramping, and pain worse.  If taking medicines:    Don t take over-the-counter diarrhea or nausea medicines unless your healthcare  provider tells you to.    You may use acetaminophen or NSAID medicines like ibuprofen or naproxen to reduce pain and fever. Don t use these if you have chronic liver or kidney disease, or ever had a stomach ulcer or gastrointestinal bleeding. Don't use NSAID medicines if you are already taking one for another condition (like arthritis) or are on daily aspirin therapy (such as for heart disease or after a stroke). Talk with your healthcare provider first.    If antibiotics were prescribed, be sure you take them until they are finished. Don t stop taking them even when you feel better. Antibiotics must be taken as a full course.  To prevent the spread of illness:    Remember that washing with soap and water and using alcohol-based  is the best way to prevent the spread of infection.    Clean the toilet after each use.    Wash your hands before eating.    Wash your hands before and after preparing food. Keep in mind that people with diarrhea or vomiting should not prepare food for others.    Wash your hands after using cutting boards, countertops, and knives that have been in contact with raw foods.    Wash and then peel fruits and vegetables.    Keep uncooked meats away from cooked and ready-to-eat foods.    Use a food thermometer when cooking. Cook poultry to at least 165 F (74 C). Cook ground meat (beef, veal, pork, lamb) to at least 160 F (71 C). Cook fresh beef, veal, lamb, and pork to at least 145 F (63 C).    Don t eat raw or undercooked eggs (poached or remedios side up), poultry, meat, or unpasteurized milk and juices.  Food and drinks  The main goal while treating vomiting or diarrhea is to prevent dehydration. This is done by taking small amounts of liquids often.    Keep in mind that liquids are more important than food right now.    Drink only small amounts of liquids at a time.    Don t force yourself to eat, especially if you are having cramping, vomiting, or diarrhea. Don t eat large amounts at a  time, even if you are hungry.    If you eat, avoid fatty, greasy, spicy, or fried foods.    Don t eat dairy foods or drink milk if you have diarrhea. These can make diarrhea worse.  During the first 24 hours you can try:    Oral rehydration solutions. Do not use sports drinks. They have too much sugar and not enough electrolytes.    Soft drinks without caffeine    Ginger ale    Water (plain or flavored)    Decaf tea or coffee    Clear broth, consommé, or bouillon    Gelatin, popsicles, or frozen fruit juice bars  The second 24 hours, if you are feeling better, you can add:    Hot cereal, plain toast, bread, rolls, or crackers    Plain noodles, rice, mashed potatoes, chicken noodle soup, or rice soup    Unsweetened canned fruit (no pineapple)    Bananas  As you recover:    Limit fat intake to less than 15 grams per day. Don t eat margarine, butter, oils, mayonnaise, sauces, gravies, fried foods, peanut butter, meat, poultry, or fish.    Limit fiber. Don t eat raw or cooked vegetables, fresh fruits except bananas, or bran cereals.    Limit caffeine and chocolate.    Limit dairy.    Don t use spices or seasonings except salt.    Go back to your normal diet over time, as you feel better and your symptoms improve.    If the symptoms come back, go back to a simple diet or clear liquids.  Follow-up care  Follow up with your healthcare provider, or as advised. If a stool sample was taken or cultures were done, call the healthcare provider for the results as instructed.  Call 911  Call 911 if you have any of these symptoms:    Trouble breathing    Confusion    Extreme drowsiness or trouble walking    Loss of consciousness    Rapid heart rate    Chest pain    Stiff neck    Seizure  When to seek medical advice  Call your healthcare provider right away if any of these occur:    Abdominal pain that gets worse    Constant lower right abdominal pain    Continued vomiting and inability to keep liquids down    Diarrhea more than 5  times a day    Blood in vomit or stool    Dark urine or no urine for 8 hours, dry mouth and tongue, tiredness, weakness, or dizziness    Drowsiness    New rash    You don t get better in 2 to 3 days    Fever of 100.4 F (38 C) or higher that doesn t get lower with medicine    2336-0155 [employer]. [address] [place] [address]. All rights reserved. This information is not intended as a substitute for professional medical care. Always follow your healthcare professional's instructions.          Treating Diarrhea  Diarrhea occurs when you have loose, watery, or frequent bowel movements. It is a common problem with many causes. Most cases of diarrhea clear up on their own. But certain cases may need treatment. Be sure to see your health care provider if your symptoms do not improve within a few days.    Getting Relief  Treatment of diarrhea depends on its cause. Diarrhea caused by bacterial or parasite infection is often treated with antibiotics. Diarrhea caused by other factors, such as a stomach virus, often improves with simple home treatment. The tips below may also help relieve your symptoms.    Drink plenty of fluids. This helps prevent too much fluid loss (dehydration). Water, clear soups, and electrolyte solutions are good choices. Avoid alcohol, coffee, tea, and milk. These can make symptoms worse.    Suck on ice chips if drinking makes you queasy.    Return to your normal diet slowly. You may want to eat bland foods at first, such as rice and toast. Also, you may need to avoid certain foods for a while, such as dairy products. These can make symptoms worse. Ask your health care provider if there are any other foods you should avoid.    If you were prescribed antibiotics, take them as directed.    Do not take anti-diarrhea medications without asking your health care provider first.  Call Your Health Care Provider If You Have:    Fever of 102 F (38.0 C) or higher    Severe  pain    Worsening diarrhea or diarrhea for more than 2 days    Bloody vomit or stool    Signs of dehydration (dizziness, dry mouth and tongue, rapid pulse, dark urine)    3128-7596 [employer]. [address] [place] [address], [place] [address]. All rights reserved. This information is not intended as a substitute for professional medical care. Always follow your healthcare professional's instructions.          Diet for Vomiting or Diarrhea (Adult)    If your symptoms return or get worse after eating certain foods listed below, you should stop eating them until your symptoms ease and you feel better.  Once the vomiting stops, then follow the steps below.   During the first 12 to 24 hours  During the first 12 to 24 hours, follow this diet:    Beverages. Plain water, sport drinks like electrolyte solutions, soft drinks without caffeine, mineral water (plain or flavored), clear fruit juices, and decaffeinated tea and coffee.    Soups. Clear broth, consommé, and bouillon.    Desserts. Plain gelatin, popsicles, and fruit juice bars. As you feel better, you may add 6 to 8 ounces of yogurt per day. If you have diarrhea, don't have foods or beverages that contain sugar, high-fructose corn syrup, or sugar alcohols.  During the next 24 hours  During the next 24 hours you may add the following to the above:    Hot cereal, plain toast, bread, rolls, and crackers    Plain noodles, rice, mashed potatoes, and chicken noodle or rice soup    Unsweetened canned fruit (but not pineapple) and bananas  Don't have more than 15 grams of fat a day. Do this by staying away from margarine, butter, oils, mayonnaise, sauces, gravies, fried foods, peanut butter, meat, poultry, and fish.  Don't eat much fiber. Stay away from raw or cooked vegetables, fresh fruits (except bananas), and bran cereals.  Limit how much caffeine and chocolate you have. Do not use any spices or seasonings except salt.  During the next 24 hours  Gradually go back  to your normal diet, as you feel better and your symptoms ease.    2013-7413 [employer]. [address] [place] [address]. All rights reserved. This information is not intended as a substitute for professional medical care. Always follow your healthcare professional's instructions.          Dehydration    The human body is comprised largely of water. If you lose more fluids than you take in, you can become dehydrated. This means there are not enough fluids in your body for it to function right. Mild dehydration can cause weakness, confusion, or muscle cramps. In extreme cases, it can lead to brain damage and even death. That's why prompt treatment is crucial.  Risk factors  Anyone can become dehydrated. But infants, children, and older adults are at greatest risk. You are most likely to lose fluids with severe vomiting, diarrhea, or a fever. Exercising or working hard--especially in hot weather--can also cause excess fluid loss.  What to do  Drinking liquids is the best way to prevent dehydration. Water is best, but juice or frozen pops can also help. Your doctor may suggest electrolyte solutions for sick infants and young children.  When to go to the emergency room (ER)  Go to an ER right away for these symptoms:  Adults    Very dark urine and little urine output    Dizziness, weakness, confusion, fainting  Children    Sunken eyes    Little or no urine output (for infants, no wet diaper in 8 hours)    Very dark urine    Skin that doesn't bounce back quickly when pinched    Crying without tears  What to expect in the ER  Your blood pressure, temperature, and heart rate will be checked. You may have blood or urine tests. The main treatment for dehydration is fluids. You may be given these to drink. Or, you may receive them through a vein in your arm. You also may be treated for diarrhea, vomiting, or a high fever.     4371-2261 [employer]. [address] [place] [address]  [address]. All rights reserved. This information is not intended as a substitute for professional medical care. Always follow your healthcare professional's instructions.          Discharge References/Attachments     FOOD POISONING OR GASTROENTERITIS (ADULT) (ENGLISH)      Future Appointments        Provider Department Dept Phone Center    6/28/2017 1:30 PM Aurora Rojas MD [facility] [phone] [facility]    8/28/2017 3:00 PM Dorothy Chavez MD [facility] [phone] [facility]      24 Hour Appointment Hotline       To make an appointment at any [facility], call [phone] ([phone]). If you don't have a family doctor or clinic, we will help you find one. [facility] (formerly [facility] are conveniently located to serve the needs of you and your family.          ED Discharge Orders     Clostridium difficile toxin B PCR       Stool Specimen            Enteric Bacteria and Virus Panel by OSITO Stool                    Review of your medicines      Our records show that you are taking the medicines listed below. If these are incorrect, please call your family doctor or clinic.        Dose / Directions Last dose taken    albuterol 108 (90 BASE) MCG/ACT Inhaler   Commonly known as:  albuterol   Dose:  2 puff   Quantity:  1 Inhaler        Inhale 2 puffs into the lungs every 4 hours as needed for shortness of breath / dyspnea   Refills:  0        butalbital-acetaminophen-caffeine -40 MG Caps per capsule   Commonly known as:  FIORICET/ESGIC   Dose:  1 capsule   Quantity:  60 capsule        Take 1 capsule by mouth every 4 hours as needed for headaches   Refills:  0        CELEXA PO   Dose:  20 mg        Take 20 mg by mouth every evening   Refills:  0        CO Q 10 PO   Dose:  1 tablet        Take 1 tablet by mouth daily   Refills:  0        gabapentin 300 MG capsule   Commonly known as:  NEURONTIN   Dose:  600 mg   Quantity:  180 capsule        Take 2 capsules (600 mg) by mouth 3 times  daily   Refills:  5        * HYDROcodone-acetaminophen 5-325 MG per tablet   Commonly known as:  NORCO        1 to 2 tablets every 4 hours as needed for migraine   Refills:  0        * HYDROcodone-acetaminophen 5-325 MG per tablet   Commonly known as:  NORCO   Dose:  1-2 tablet   Quantity:  10 tablet        Take 1-2 tablets by mouth every 4 hours as needed for moderate to severe pain   Refills:  0        LORazepam 1 MG tablet   Commonly known as:  ATIVAN   Dose:  0.5-1 mg   Quantity:  30 tablet        Take 0.5-1 tablets (0.5-1 mg) by mouth every 8 hours as needed for anxiety   Refills:  1        MAGNESIUM OXIDE PO   Dose:  400 mg        Take 400 mg by mouth daily   Refills:  0        MULTIVITAMIN ADULT PO        Refills:  0        topiramate 100 MG tablet   Commonly known as:  TOPAMAX   Dose:  150 mg   Quantity:  90 tablet        Take 1.5 tablets (150 mg) by mouth 2 times daily . Increase to this dose as instructed in clinic.   Refills:  3        ZOFRAN PO        Take by mouth as needed for nausea or vomiting   Refills:  0        * Notice:  This list has 2 medication(s) that are the same as other medications prescribed for you. Read the directions carefully, and ask your doctor or other care provider to review them with you.            Procedures and tests performed during your visit     CBC with platelets, differential    Comprehensive metabolic panel      Orders Needing Specimen Collection     Ordered          06/01/17 1448  Enteric Bacteria and Virus Panel by OS[facility]O Stool - ROUTINE, Prio: Routine, Needs to be Collected     Scheduled Task Status   06/01/17 1448 Collect Enteric Bacteria and Virus Panel by OSITO Stool Open   Order Class:  PCU Collect                06/01/17 1448  Clostridium difficile toxin B PCR - ROUTINE, Prio: Routine, Needs to be Collected     Scheduled Task Status   06/01/17 1448 Collect Clostridium difficile toxin B PCR Open   Order Class:  PCU Collect                  Pending Results     No orders  found from 5/30/2017 to 6/2/2017.            Pending Culture Results     No orders found from 5/30/2017 to 6/2/2017.            Pending Results Instructions     If you had any lab results that were not finalized at the time of your Discharge, you can call the ED Lab Result RN at [phone]. You will be contacted by this team for any positive Lab results or changes in treatment. The nurses are available 7 days a week from 10A to 6:30P.  You can leave a message 24 hours per day and they will return your call.        Test Results From Your Hospital Stay        6/1/2017  3:20 PM      Component Results     Component Value Ref Range & Units Status    WBC 8.4 4.0 - 11.0 10e9/L Final    RBC Count 4.60 3.8 - 5.2 10e12/L Final    Hemoglobin 10.2 (L) 11.7 - 15.7 g/dL Final    Hematocrit 33.4 (L) 35.0 - 47.0 % Final    MCV 73 (L) 78 - 100 fl Final    MCH 22.2 (L) 26.5 - 33.0 pg Final    MCHC 30.5 (L) 31.5 - 36.5 g/dL Final    RDW 17.8 (H) 10.0 - 15.0 % Final    Platelet Count 369 150 - 450 10e9/L Final    Diff Method Automated Method  Final    % Neutrophils 68.0 % Final    % Lymphocytes 19.7 % Final    % Monocytes 7.9 % Final    % Eosinophils 3.7 % Final    % Basophils 0.5 % Final    % Immature Granulocytes 0.2 % Final    Absolute Neutrophil 5.7 1.6 - 8.3 10e9/L Final    Absolute Lymphocytes 1.7 0.8 - 5.3 10e9/L Final    Absolute Monocytes 0.7 0.0 - 1.3 10e9/L Final    Absolute Eosinophils 0.3 0.0 - 0.7 10e9/L Final    Absolute Basophils 0.0 0.0 - 0.2 10e9/L Final    Abs Immature Granulocytes 0.0 0 - 0.4 10e9/L Final         6/1/2017  3:35 PM      Component Results     Component Value Ref Range & Units Status    Sodium 139 133 - 144 mmol/L Final    Potassium 3.2 (L) 3.4 - 5.3 mmol/L Final    Chloride 113 (H) 94 - 109 mmol/L Final    Carbon Dioxide 18 (L) 20 - 32 mmol/L Final    Anion Gap 8 3 - 14 mmol/L Final    Glucose 75 70 - 99 mg/dL Final    Urea Nitrogen 7 7 - 30 mg/dL Final    Creatinine 0.71 0.52 - 1.04 mg/dL Final     GFR Estimate >90  Non  GFR Calc   >60 mL/min/1.7m2 Final    GFR Estimate If Black >90   GFR Calc   >60 mL/min/1.7m2 Final    Calcium 8.2 (L) 8.5 - 10.1 mg/dL Final    Bilirubin Total 0.2 0.2 - 1.3 mg/dL Final    Albumin 3.9 3.4 - 5.0 g/dL Final    Protein Total 6.9 6.8 - 8.8 g/dL Final    Alkaline Phosphatase 87 40 - 150 U/L Final    ALT 19 0 - 50 U/L Final    AST 22 0 - 45 U/L Final                Thank you for choosing [facility]       Thank you for choosing [facility] for your care. Our goal is always to provide you with excellent care. Hearing back from our patients is one way we can continue to improve our services. Please take a few minutes to complete the written survey that you may receive in the mail after you visit with us. Thank you!        [email or web address] Information     [employer] gives you secure access to your electronic health record. If you see a primary care provider, you can also send messages to your care team and make appointments. If you have questions, please call your primary care clinic.  If you do not have a primary care provider, please call [phone] and they will assist you.        Care EveryWhere ID     This is your Care EveryWhere ID. This could be used by other organizations to access your [facility] medical records  [id number]        After Visit Summary       This is your record. Keep this with you and show to your community pharmacist(s) and doctor(s) at your next visit.

## 2017-06-01 NOTE — DISCHARGE INSTRUCTIONS
Anatomy of the Digestive System  Food gives the body the energy needed for life. The digestive system breaks food down into basic nutrients that can be used by the body. The digestive tract is a long, muscular tube that extends from the mouth through the stomach and intestines to the anus. As food moves along the digestive tract, it is digested (changed into substances that can be absorbed into the bloodstream). Certain organs (such as the liver, gallbladder, and pancreas) help with this digestion. Parts of food that cannot be digested are turned into stool, which is waste material that is passed out of the body.  Digestive system      The mouth takes in food, breaks it into pieces, and begins the process of digestion.    The esophagus moves food from the mouth to the stomach.    The stomach breaks food down into a liquid mixture.    The liver makes bile that helps digest fat.    The gallbladder stores bile.    The pancreas makes enzymes that help in digestion.    The small intestine digests food further and absorbs nutrients. What is left is passed on to the colon as liquid waste.    The large intestine (colon) absorbs water, salt, and minerals from the waste, forming a solid stool.    The rectum stores stool until a bowel movement occurs.    The anus is the opening where stool leaves the body.    4256-1524 [employer]. [address] [place] [address]. All rights reserved. This information is not intended as a substitute for professional medical care. Always follow your healthcare professional's instructions.          Uncertain Causes of Diarrhea (Adult)    Diarrhea is when stools are loose and watery. This can be caused by:    Viral infections    Bacterial infections    Food poisoning    Parasites    Irritable bowel syndrome (IBS)    Inflammatory bowel diseases such as ulcerative colitis, Crohn's disease, and celiac disease    Food intolerance, such as to lactose, the sugar found in milk  and milk products    Reaction to medicines like antibiotics, laxatives, cancer drugs, and antacids  Along with diarrhea, you may also have:    Abdominal pain and cramping    Nausea and vomiting    Loss of bowel control    Fever and chills    Bloody stools  In some cases, antibiotics may help to treat diarrhea. You may have a stool sample test. This is done to see what is causing your diarrhea, and if antibiotics will help treat it. The results of a stool sample test may take up to 2 days. The healthcare provider may not give you antibiotics until he or she has the stool test results.  Diarrhea can cause dehydration. This is the loss of too much water and other fluids from the body. When this occurs, body fluid must be replaced. This can be done with oral rehydration solutions. Oral rehydration solutions are available at drugstores and grocery stores without a prescription.  Home care  Follow all instructions given by your healthcare provider. Rest at home for the next 24 hours, or until you feel better. Avoid caffeine, tobacco, and alcohol. These can make diarrhea, cramping, and pain worse.  If taking medicines:    Don t take over-the-counter diarrhea or nausea medicines unless your healthcare provider tells you to.    You may use acetaminophen or NSAID medicines like ibuprofen or naproxen to reduce pain and fever. Don t use these if you have chronic liver or kidney disease, or ever had a stomach ulcer or gastrointestinal bleeding. Don't use NSAID medicines if you are already taking one for another condition (like arthritis) or are on daily aspirin therapy (such as for heart disease or after a stroke). Talk with your healthcare provider first.    If antibiotics were prescribed, be sure you take them until they are finished. Don t stop taking them even when you feel better. Antibiotics must be taken as a full course.  To prevent the spread of illness:    Remember that washing with soap and water and using  alcohol-based  is the best way to prevent the spread of infection.    Clean the toilet after each use.    Wash your hands before eating.    Wash your hands before and after preparing food. Keep in mind that people with diarrhea or vomiting should not prepare food for others.    Wash your hands after using cutting boards, countertops, and knives that have been in contact with raw foods.    Wash and then peel fruits and vegetables.    Keep uncooked meats away from cooked and ready-to-eat foods.    Use a food thermometer when cooking. Cook poultry to at least 165 F (74 C). Cook ground meat (beef, veal, pork, lamb) to at least 160 F (71 C). Cook fresh beef, veal, lamb, and pork to at least 145 F (63 C).    Don t eat raw or undercooked eggs (poached or remedios side up), poultry, meat, or unpasteurized milk and juices.  Food and drinks  The main goal while treating vomiting or diarrhea is to prevent dehydration. This is done by taking small amounts of liquids often.    Keep in mind that liquids are more important than food right now.    Drink only small amounts of liquids at a time.    Don t force yourself to eat, especially if you are having cramping, vomiting, or diarrhea. Don t eat large amounts at a time, even if you are hungry.    If you eat, avoid fatty, greasy, spicy, or fried foods.    Don t eat dairy foods or drink milk if you have diarrhea. These can make diarrhea worse.  During the first 24 hours you can try:    Oral rehydration solutions. Do not use sports drinks. They have too much sugar and not enough electrolytes.    Soft drinks without caffeine    Ginger ale    Water (plain or flavored)    Decaf tea or coffee    Clear broth, consommé, or bouillon    Gelatin, popsicles, or frozen fruit juice bars  The second 24 hours, if you are feeling better, you can add:    Hot cereal, plain toast, bread, rolls, or crackers    Plain noodles, rice, mashed potatoes, chicken noodle soup, or rice soup    Unsweetened  canned fruit (no pineapple)    Bananas  As you recover:    Limit fat intake to less than 15 grams per day. Don t eat margarine, butter, oils, mayonnaise, sauces, gravies, fried foods, peanut butter, meat, poultry, or fish.    Limit fiber. Don t eat raw or cooked vegetables, fresh fruits except bananas, or bran cereals.    Limit caffeine and chocolate.    Limit dairy.    Don t use spices or seasonings except salt.    Go back to your normal diet over time, as you feel better and your symptoms improve.    If the symptoms come back, go back to a simple diet or clear liquids.  Follow-up care  Follow up with your healthcare provider, or as advised. If a stool sample was taken or cultures were done, call the healthcare provider for the results as instructed.  Call 911  Call 911 if you have any of these symptoms:    Trouble breathing    Confusion    Extreme drowsiness or trouble walking    Loss of consciousness    Rapid heart rate    Chest pain    Stiff neck    Seizure  When to seek medical advice  Call your healthcare provider right away if any of these occur:    Abdominal pain that gets worse    Constant lower right abdominal pain    Continued vomiting and inability to keep liquids down    Diarrhea more than 5 times a day    Blood in vomit or stool    Dark urine or no urine for 8 hours, dry mouth and tongue, tiredness, weakness, or dizziness    Drowsiness    New rash    You don t get better in 2 to 3 days    Fever of 100.4 F (38 C) or higher that doesn t get lower with medicine    8563-8777 [employer]. [address] [place] [address]. All rights reserved. This information is not intended as a substitute for professional medical care. Always follow your healthcare professional's instructions.          Treating Diarrhea  Diarrhea occurs when you have loose, watery, or frequent bowel movements. It is a common problem with many causes. Most cases of diarrhea clear up on their own. But certain cases  may need treatment. Be sure to see your health care provider if your symptoms do not improve within a few days.    Getting Relief  Treatment of diarrhea depends on its cause. Diarrhea caused by bacterial or parasite infection is often treated with antibiotics. Diarrhea caused by other factors, such as a stomach virus, often improves with simple home treatment. The tips below may also help relieve your symptoms.    Drink plenty of fluids. This helps prevent too much fluid loss (dehydration). Water, clear soups, and electrolyte solutions are good choices. Avoid alcohol, coffee, tea, and milk. These can make symptoms worse.    Suck on ice chips if drinking makes you queasy.    Return to your normal diet slowly. You may want to eat bland foods at first, such as rice and toast. Also, you may need to avoid certain foods for a while, such as dairy products. These can make symptoms worse. Ask your health care provider if there are any other foods you should avoid.    If you were prescribed antibiotics, take them as directed.    Do not take anti-diarrhea medications without asking your health care provider first.  Call Your Health Care Provider If You Have:    Fever of 102 F (38.0 C) or higher    Severe pain    Worsening diarrhea or diarrhea for more than 2 days    Bloody vomit or stool    Signs of dehydration (dizziness, dry mouth and tongue, rapid pulse, dark urine)    0323-8199 [employer]. [address] [place] [address], [place] [address]. All rights reserved. This information is not intended as a substitute for professional medical care. Always follow your healthcare professional's instructions.          Diet for Vomiting or Diarrhea (Adult)    If your symptoms return or get worse after eating certain foods listed below, you should stop eating them until your symptoms ease and you feel better.  Once the vomiting stops, then follow the steps below.   During the first 12 to 24 hours  During the first 12 to 24 hours,  follow this diet:    Beverages. Plain water, sport drinks like electrolyte solutions, soft drinks without caffeine, mineral water (plain or flavored), clear fruit juices, and decaffeinated tea and coffee.    Soups. Clear broth, consommé, and bouillon.    Desserts. Plain gelatin, popsicles, and fruit juice bars. As you feel better, you may add 6 to 8 ounces of yogurt per day. If you have diarrhea, don't have foods or beverages that contain sugar, high-fructose corn syrup, or sugar alcohols.  During the next 24 hours  During the next 24 hours you may add the following to the above:    Hot cereal, plain toast, bread, rolls, and crackers    Plain noodles, rice, mashed potatoes, and chicken noodle or rice soup    Unsweetened canned fruit (but not pineapple) and bananas  Don't have more than 15 grams of fat a day. Do this by staying away from margarine, butter, oils, mayonnaise, sauces, gravies, fried foods, peanut butter, meat, poultry, and fish.  Don't eat much fiber. Stay away from raw or cooked vegetables, fresh fruits (except bananas), and bran cereals.  Limit how much caffeine and chocolate you have. Do not use any spices or seasonings except salt.  During the next 24 hours  Gradually go back to your normal diet, as you feel better and your symptoms ease.    4989-1057 [employer]. [address] [place] [address], [place] [address]. All rights reserved. This information is not intended as a substitute for professional medical care. Always follow your healthcare professional's instructions.          Dehydration    The human body is comprised largely of water. If you lose more fluids than you take in, you can become dehydrated. This means there are not enough fluids in your body for it to function right. Mild dehydration can cause weakness, confusion, or muscle cramps. In extreme cases, it can lead to brain damage and even death. That's why prompt treatment is crucial.  Risk factors  Anyone can become dehydrated.  But infants, children, and older adults are at greatest risk. You are most likely to lose fluids with severe vomiting, diarrhea, or a fever. Exercising or working hard--especially in hot weather--can also cause excess fluid loss.  What to do  Drinking liquids is the best way to prevent dehydration. Water is best, but juice or frozen pops can also help. Your doctor may suggest electrolyte solutions for sick infants and young children.  When to go to the emergency room (ER)  Go to an ER right away for these symptoms:  Adults    Very dark urine and little urine output    Dizziness, weakness, confusion, fainting  Children    Sunken eyes    Little or no urine output (for infants, no wet diaper in 8 hours)    Very dark urine    Skin that doesn't bounce back quickly when pinched    Crying without tears  What to expect in the ER  Your blood pressure, temperature, and heart rate will be checked. You may have blood or urine tests. The main treatment for dehydration is fluids. You may be given these to drink. Or, you may receive them through a vein in your arm. You also may be treated for diarrhea, vomiting, or a high fever.     4991-5541 [employer]. [address] [place] [id number]. All rights reserved. This information is not intended as a substitute for professional medical care. Always follow your healthcare professional's instructions.

## 2017-06-02 LAB
CAMPYLOBACTER GROUP BY NAT: NOT DETECTED
ENTERIC PATHOGEN COMMENT: NORMAL
NOROVIRUS I AND II BY NAT: NOT DETECTED
ROTAVIRUS A BY NAT: NOT DETECTED
SALMONELLA SPECIES BY NAT: NOT DETECTED
SHIGA TOXIN 1 GENE BY NAT: NOT DETECTED
SHIGA TOXIN 2 GENE BY NAT: NOT DETECTED
SHIGELLA SP+EIEC IPAH STL QL NAA+PROBE: NOT DETECTED
VIBRIO GROUP BY NAT: NOT DETECTED
YERSINIA ENTEROCOLITICA BY NAT: NOT DETECTED

## 2017-06-28 ENCOUNTER — OFFICE VISIT (OUTPATIENT)
Dept: PALLIATIVE MEDICINE | Facility: CLINIC | Age: 42
End: 2017-06-28
Payer: COMMERCIAL

## 2017-06-28 VITALS
HEART RATE: 81 BPM | WEIGHT: 140 LBS | BODY MASS INDEX: 24.8 KG/M2 | DIASTOLIC BLOOD PRESSURE: 76 MMHG | SYSTOLIC BLOOD PRESSURE: 114 MMHG | HEIGHT: 63 IN

## 2017-06-28 DIAGNOSIS — G43.719 INTRACTABLE CHRONIC MIGRAINE WITHOUT AURA AND WITHOUT STATUS MIGRAINOSUS: ICD-10-CM

## 2017-06-28 PROCEDURE — 99214 OFFICE O/P EST MOD 30 MIN: CPT | Performed by: PSYCHIATRY & NEUROLOGY

## 2017-06-28 RX ORDER — TOPIRAMATE 100 MG/1
150 TABLET, FILM COATED ORAL 2 TIMES DAILY
Qty: 90 TABLET | Refills: 6 | Status: SHIPPED | OUTPATIENT
Start: 2017-06-28

## 2017-06-28 RX ORDER — GABAPENTIN 300 MG/1
600 CAPSULE ORAL 3 TIMES DAILY
Qty: 180 CAPSULE | Refills: 5 | Status: SHIPPED | OUTPATIENT
Start: 2017-06-28

## 2017-06-28 ASSESSMENT — PAIN SCALES - GENERAL: PAINLEVEL: NO PAIN (1)

## 2017-06-28 NOTE — PROGRESS NOTES
Gayle Castelan is a 41 year old female who first started having problems with headaches when she was 17yo. There was no preceding trauma. The patient has seen Dr. Hoskins in the past for one visit for headaches but currently follows up with Dr. Chavez, neurologist.     She has a daily headache, which has improved since starting on gabapentin and topamax, with the topamax being most helpful.   She has a second type of headahe, which became more frequent in 2015 1-2 months after a divorce. She had them about 2x per week. The headaches are in the bilateral temporal regions. They had a sharp, shooting quality with associated nausea, vomiting, photophobia, phonophobia. Certain smells could trigger them as well as bright sunlight and lights. She reports yawning a lot before a headache comes on. When a headache would come on, it could last the entire day and into the next day, sometimes as long as 3-4 days. Since she got put on a steroid taper recently she would have a migraine headache every 2 weeks lasting 2-3 days.      She also has neck pain at times.    Of note, she had a mild concussion in Sept. 2106 after walking into a wall. She presented to ED and reported that she didn't lose consciousness. She had a CT of the head which was negative.                                    [facility]    Date of visit: 6/28/2017    Chief complaint:   Chief Complaint   Patient presents with     Pain     Follow up        Interval history:  Gayle Castelan was last seen by me on 2/20/17.      Recommendations/plan at the last visit included:  1. Physical Therapy: Consult for pain PT.  2. Pain Psychologist to address issues of relaxation, behavioral change, coping style, and other factors important to improvement: yes  3. Diagnostic Studies: asked her to keep headache diary  4. Medication Management:   1. Continue gabapentin as currently prescribed  2. Increase topamax to 150 mg BID.   3. Consider compazine as an  abortive.  Need to contact patient re: side effects from phenergan.  5. Further procedures recommended:   1. Discussed ONB as an abortive treatment. She can call if she wants occipital nerve block- can be double booked.   2. Consider botox in future.   3. No changes not fiorecet or Norco at this time- can continue to get from original prescriber.  Discussed avoiding overuse, but her use is very limited.  6. Acupuncture: None  7. Urine toxicology screen today: None   8. Recommendations/follow-up for PCP:  none.  9. Release of information: none  10. Follow up: 2 months    Since her last visit, Gayle Castelan reports:  -she got really sick in May, and still trying to recover from it.  She had a tetanus shot on 5/26 and then developed severe diarrhea, and then later vomiting.  She has a significant workup to determine underlying cause.  -nausea has improved  -she has some constipation  -she had to rewean medication due to being unable to keep it down.  Had a migraine for 5 days and that got better with IV fluids  -feels that she is doing better, attributes this to drinking more fluid and electrolyte changes.  -3-4 headaches a month now, can be severe.  -when very severe, will use norco, ice, pepperment, pressure, heat and hands in hot water.    Pain scores:  Pain intensity on average is 2 on a scale of 0-10.     Current pain treatments:   Gabapentin 600mg TID  Topamax 150mg BID  Fiorecet- takes 1 at a time.- not currently taking    Hydrocodone- last prescribed #30 tabs on 7/27/16- generally will take 1/month for headache    Previous medication treatments included:  Nortriptyline 50mg- not helpful, difficult to wake up  Codeine- nausea  No long-acting narcotics  Advil nh  Aleve NH  Amerge- nervous/shaking  Imitrex- stopped working  Maxalt 5mg PRN  Prednisone burst- helpful for flares- prescribed PRN     Other treatments have included:  Gayle SAMS Flip has not been seen at a pain clinic in the past.   PT: Tried, massage was  "helpful  Relaxation techniques/biofeedback: none formal  Chiropractor: none  Acupuncture: none  TENs Unit: none  Injections: none    Side Effects: no side effect    Medications:  Current Outpatient Prescriptions   Medication Sig Dispense Refill     topiramate (TOPAMAX) 100 MG tablet Take 1.5 tablets (150 mg) by mouth 2 times daily 90 tablet 6     gabapentin (NEURONTIN) 300 MG capsule Take 2 capsules (600 mg) by mouth 3 times daily 180 capsule 5     HYDROcodone-acetaminophen (NORCO) 5-325 MG per tablet Take 1-2 tablets by mouth every 4 hours as needed for moderate to severe pain 10 tablet 0     butalbital-acetaminophen-caffeine (FIORICET/ESGIC) -40 MG CAPS per capsule Take 1 capsule by mouth every 4 hours as needed for headaches 60 capsule 0     Multiple Vitamins-Minerals (MULTIVITAMIN ADULT PO)        MAGNESIUM OXIDE PO Take 400 mg by mouth daily       Coenzyme Q10 (CO Q 10 PO) Take 1 tablet by mouth daily       LORazepam (ATIVAN) 1 MG tablet Take 0.5-1 tablets (0.5-1 mg) by mouth every 8 hours as needed for anxiety 30 tablet 1     Ondansetron HCl (ZOFRAN PO) Take by mouth as needed for nausea or vomiting       Citalopram Hydrobromide (CELEXA PO) Take 20 mg by mouth every evening        albuterol (ALBUTEROL) 108 (90 BASE) MCG/ACT inhaler Inhale 2 puffs into the lungs every 4 hours as needed for shortness of breath / dyspnea 1 Inhaler 0       Medical History: any changes in medical history since they were last seen? possible bacterial infection    Review of Systems:  The 14 system ROS was reviewed from the intake questionnaire, and is positive for: contipation, diarrhea, weight loss  Any bowel or bladder problems: no  Mood: none    Physical Exam:  Blood pressure 114/76, pulse 81, height 1.6 m (5' 3\"), weight 63.5 kg (140 lb), not currently breastfeeding.  General: awake, alert  Gait: Normal  MSK exam: deferred for exam    Assessment:   1. Chronic migraine headache without aura  2. Psychosocial " stressors  3. Recent GI illness    Gayle Castelan is a 41 year old female who is seen at the pain clinic for headaches.  Doing well despite no significant change in med other than topamax.      Plan:  1. Physical Therapy:  Will rediscuss at next appt if headaches not doing well  2. Clinical Health Psychologist to address issues of relaxation, behavioral change, coping style, and other factors important to improvement.  Will rediscuss at next appt if headaches not doing well  3. Diagnostic Studies:  none  4. Medication Management:  No changes  5. Further procedures recommended: none  6. Recommendations to PCP: none  7. Follow up: 3-4 months    Total time spent was 30 minutes, and more than 50% of face to face time was spent in counseling and/or coordination of care regarding medications, recent hospitalization.      Tamra Rojas MD

## 2017-06-28 NOTE — MR AVS SNAPSHOT
After Visit Summary   6/28/2017    Gayle Castelan    MRN: [id number]           Patient Information     Date Of Birth          1975        Visit Information        Provider Department      6/28/2017 1:30 PM Aurora Rojas MD [facility]        Today's Diagnoses     Intractable chronic migraine without aura and without status migrainosus        Chronic daily headache        Migraine with aura and without status migrainosus, not intractable          Care Instructions    1. Schedule follow up in 3-4 months.    ----------------------------------------------------------------  Nurse Triage line:  [phone]   Call this number with any questions or concerns. You may leave a detailed message anytime. Calls are typically returned Monday through Friday between 8 AM and 4:30 PM. We usually get back to you within 2 business days depending on the issue/request.       Medication refills:    For non-narcotic medications, call your pharmacy directly to request a refill. The pharmacy will contact the [facility] for authorization. Please allow 3-4 days for these refills to be processed.     For narcotic refills, call the nurse triage line or send a [employer] message. Please contact us 7-10 days before your refill is due. The message MUST include the name of the specific medication(s) requested and how you would like to receive the prescription(s). The options are as follows:    Pain Clinic staff can mail the prescription to your pharmacy. Please tell us the name of the pharmacy.    You may pick the prescription up at the Pain Clinic (tell us the location) or during a clinic visit with your pain provider    Pain Clinic staff can deliver the prescription to the [facility] pharmacy in the clinic building. Please tell us the location.      Scheduling number: [phone].  Call this number to schedule or change appointments.    We believe regular attendance is key to your success in our  "program.    Any time you are unable to keep your appointment we ask that you call us at least 24 hours in advance to let us know. This will allow us to offer the appointment time to another patient.               Follow-ups after your visit        Your next 10 appointments already scheduled     Aug 28, 2017  3:00 PM CDT   Return Visit with Dorothy Chavez MD   [facility] ([facility]    [address] [address] [phone]              Who to contact     If you have questions or need follow up information about today's clinic visit or your schedule please contact [facility] directly at [phone].  Normal or non-critical lab and imaging results will be communicated to you by MyChart, letter or phone within 4 business days after the clinic has received the results. If you do not hear from us within 7 days, please contact the clinic through [employer] or phone. If you have a critical or abnormal lab result, we will notify you by phone as soon as possible.  Submit refill requests through [email or web address] or call your pharmacy and they will forward the refill request to us. Please allow 3 business days for your refill to be completed.          Additional Information About Your Visit        [employer] Information     [email or web address] gives you secure access to your electronic health record. If you see a primary care provider, you can also send messages to your care team and make appointments. If you have questions, please call your primary care clinic.  If you do not have a primary care provider, please call [phone] and they will assist you.        Care EveryWhere ID     This is your Care EveryWhere ID. This could be used by other organizations to access your [facility] medical records  [id number]        Your Vitals Were     Pulse Height BMI (Body Mass Index)             81 1.6 m (5' 3\") 24.8 kg/m2          Blood Pressure from Last 3 Encounters:   06/28/17 114/76 "   06/01/17 96/59   02/23/17 103/63    Weight from Last 3 Encounters:   06/28/17 63.5 kg (140 lb)   06/01/17 65.8 kg (145 lb)   02/20/17 69.9 kg (154 lb)              Today, you had the following     No orders found for display         Today's Medication Changes          These changes are accurate as of: 6/28/17  1:41 PM.  If you have any questions, ask your nurse or doctor.               These medicines have changed or have updated prescriptions.        Dose/Directions    HYDROcodone-acetaminophen 5-325 MG per tablet   Commonly known as:  NORCO   This may have changed:  Another medication with the same name was removed. Continue taking this medication, and follow the directions you see here.   Used for:  Other chronic pain        Dose:  1-2 tablet   Take 1-2 tablets by mouth every 4 hours as needed for moderate to severe pain   Quantity:  10 tablet   Refills:  0       topiramate 100 MG tablet   Commonly known as:  TOPAMAX   This may have changed:  additional instructions   Used for:  Intractable chronic migraine without aura and without status migrainosus        Dose:  150 mg   Take 1.5 tablets (150 mg) by mouth 2 times daily   Quantity:  90 tablet   Refills:  6            Where to get your medicines      These medications were sent to [place] [address] [place] [id number]     Phone:  [phone]     gabapentin 300 MG capsule    topiramate 100 MG tablet                Primary Care Provider Office Phone # Fax #    Subhash [facility] [phone]       [address] [place] [id number]        Equal Access to Services     [facility] FRANDY AH: Hadii aad ku hadasho Sokaya, waaxda luqadaha, qaybta kaalmada adeegyada, tanya ineves. So [facility] [phone].    ATENCIÓN: Si habla español, tiene a rivera disposición servicios gratuitos de asistencia lingüística. Llame al [phone].    We comply with applicable federal  civil rights laws and [place] laws. We do not discriminate on the basis of race, color, national origin, age, disability sex, sexual orientation or gender identity.            Thank you!     Thank you for choosing [facility]  for your care. Our goal is always to provide you with excellent care. Hearing back from our patients is one way we can continue to improve our services. Please take a few minutes to complete the written survey that you may receive in the mail after your visit with us. Thank you!             Your Updated Medication List - Protect others around you: Learn how to safely use, store and throw away your medicines at www.disposemymeds.org.          This list is accurate as of: 6/28/17  1:41 PM.  Always use your most recent med list.                   Brand Name Dispense Instructions for use Diagnosis    albuterol 108 (90 BASE) MCG/ACT Inhaler    albuterol    1 Inhaler    Inhale 2 puffs into the lungs every 4 hours as needed for shortness of breath / dyspnea        butalbital-acetaminophen-caffeine -40 MG Caps per capsule    FIORICET/ESGIC    60 capsule    Take 1 capsule by mouth every 4 hours as needed for headaches    Chronic daily headache, Migraine without aura and without status migrainosus, not intractable       CELEXA PO      Take 20 mg by mouth every evening        CO Q 10 PO      Take 1 tablet by mouth daily        gabapentin 300 MG capsule    NEURONTIN    180 capsule    Take 2 capsules (600 mg) by mouth 3 times daily    Chronic daily headache, Migraine with aura and without status migrainosus, not intractable       HYDROcodone-acetaminophen 5-325 MG per tablet    NORCO    10 tablet    Take 1-2 tablets by mouth every 4 hours as needed for moderate to severe pain    Other chronic pain       LORazepam 1 MG tablet    ATIVAN    30 tablet    Take 0.5-1 tablets (0.5-1 mg) by mouth every 8 hours as needed for anxiety    Anxiety       MAGNESIUM OXIDE PO      Take 400 mg by mouth  daily        MULTIVITAMIN ADULT PO           topiramate 100 MG tablet    TOPAMAX    90 tablet    Take 1.5 tablets (150 mg) by mouth 2 times daily    Intractable chronic migraine without aura and without status migrainosus       ZOFRAN PO      Take by mouth as needed for nausea or vomiting

## 2017-06-28 NOTE — NURSING NOTE
"Chief Complaint   Patient presents with     Pain       Initial /76  Pulse 81  Ht 1.6 m (5' 3\")  Wt 63.5 kg (140 lb)  BMI 24.8 kg/m2 Estimated body mass index is 24.8 kg/(m^2) as calculated from the following:    Height as of this encounter: 1.6 m (5' 3\").    Weight as of this encounter: 63.5 kg (140 lb).  Medication Reconciliation: complete     Keren Lugo MA      "

## 2017-06-28 NOTE — PATIENT INSTRUCTIONS
1. Schedule follow up in 3-4 months.    ----------------------------------------------------------------  Nurse Triage line:  [phone]   Call this number with any questions or concerns. You may leave a detailed message anytime. Calls are typically returned Monday through Friday between 8 AM and 4:30 PM. We usually get back to you within 2 business days depending on the issue/request.       Medication refills:    For non-narcotic medications, call your pharmacy directly to request a refill. The pharmacy will contact the [facility] for authorization. Please allow 3-4 days for these refills to be processed.     For narcotic refills, call the nurse triage line or send a [employer] message. Please contact us 7-10 days before your refill is due. The message MUST include the name of the specific medication(s) requested and how you would like to receive the prescription(s). The options are as follows:    Pain Clinic staff can mail the prescription to your pharmacy. Please tell us the name of the pharmacy.    You may pick the prescription up at the Pain Clinic (tell us the location) or during a clinic visit with your pain provider    Pain Clinic staff can deliver the prescription to the [place] pharmacy in the clinic building. Please tell us the location.      Scheduling number: [phone].  Call this number to schedule or change appointments.    We believe regular attendance is key to your success in our program.    Any time you are unable to keep your appointment we ask that you call us at least 24 hours in advance to let us know. This will allow us to offer the appointment time to another patient.

## 2017-07-15 ENCOUNTER — HEALTH MAINTENANCE LETTER (OUTPATIENT)
Age: 42
End: 2017-07-15

## 2017-09-18 ENCOUNTER — OFFICE VISIT (OUTPATIENT)
Dept: PALLIATIVE MEDICINE | Facility: CLINIC | Age: 42
End: 2017-09-18
Payer: COMMERCIAL

## 2017-09-18 VITALS
DIASTOLIC BLOOD PRESSURE: 67 MMHG | WEIGHT: 137 LBS | BODY MASS INDEX: 24.27 KG/M2 | HEIGHT: 63 IN | HEART RATE: 66 BPM | SYSTOLIC BLOOD PRESSURE: 102 MMHG

## 2017-09-18 DIAGNOSIS — G43.719 INTRACTABLE CHRONIC MIGRAINE WITHOUT AURA AND WITHOUT STATUS MIGRAINOSUS: Primary | ICD-10-CM

## 2017-09-18 PROCEDURE — 99214 OFFICE O/P EST MOD 30 MIN: CPT | Performed by: PSYCHIATRY & NEUROLOGY

## 2017-09-18 ASSESSMENT — PAIN SCALES - GENERAL: PAINLEVEL: NO PAIN (1)

## 2017-09-18 NOTE — PATIENT INSTRUCTIONS
1. When you take a dose of acetaminophen, you can use 650mg up to 1000mg.  Max dose daily is 3000mg.    2. You can take up to 800mg per dose of ibuprofen.  Take with food.  Max daily dose of ibuprofen is 2400mg.    3. Try caffeine at onset of headache.  4. Schedule in 3-4 months, but mychart in ~2 months with update.    ----------------------------------------------------------------  Nurse Triage line:  [phone]   Call this number with any questions or concerns. You may leave a detailed message anytime. Calls are typically returned Monday through Friday between 8 AM and 4:30 PM. We usually get back to you within 2 business days depending on the issue/request.       Medication refills:    For non-narcotic medications, call your pharmacy directly to request a refill. The pharmacy will contact the [facility] for authorization. Please allow 3-4 days for these refills to be processed.     For narcotic refills, call the nurse triage line or send a [employer] message. Please contact us 7-10 days before your refill is due. The message MUST include the name of the specific medication(s) requested and how you would like to receive the prescription(s). The options are as follows:    Pain Clinic staff can mail the prescription to your pharmacy. Please tell us the name of the pharmacy.    You may pick the prescription up at the Pain Clinic (tell us the location) or during a clinic visit with your pain provider    Pain Clinic staff can deliver the prescription to the [place] pharmacy in the clinic building. Please tell us the location.      Scheduling number: [phone].  Call this number to schedule or change appointments.    We believe regular attendance is key to your success in our program.    Any time you are unable to keep your appointment we ask that you call us at least 24 hours in advance to let us know. This will allow us to offer the appointment time to another patient.

## 2017-09-18 NOTE — PROGRESS NOTES
[facility]    Date of visit: 9/18/2017     Chief complaint:   Chief Complaint   Patient presents with     Pain     Migraine       Interval history:  Gayle Castelan was last seen by me on 6/28/17.      Recommendations/plan at the last visit included:  1. Physical Therapy:  Will rediscuss at next appt if headaches not doing well  2. Clinical Health Psychologist to address issues of relaxation, behavioral change, coping style, and other factors important to improvement.  Will rediscuss at next appt if headaches not doing well  3. Diagnostic Studies:  none  4. Medication Management:  No changes  5. Further procedures recommended: none  6. Recommendations to PCP: none  7. Follow up: 3-4 months      Since her last visit, Gayle Castelan reports:  -No change in quality of headaches and is still throbbing and sharp.  -It usually begins unilaterally in the frontal and travels to the back of her head.    -Scents, lights, stress can trigger her migraines.  She has about 4 migraines a month and can last anywhere from several hours to three days.      Pain scores:  Pain intensity on average is 1 on a scale of 0-10 but when she has severe migraines it can be an 8/10.      Current pain treatments:   Gabapentin 600mg TID  Topamax 150mg BID  Hydrocodone- last prescribed #30 tabs on 7/27/16- generally will take 1/month for headache    Previous medication treatments included:  Nortriptyline 50mg- not helpful, difficult to wake up  Codeine- nausea  No long-acting narcotics  Advil nh  Aleve NH  Amerge- nervous/shaking  Fiorecet   Imitrex- stopped working  Maxalt 5mg PRN  Prednisone burst- helpful for flares- prescribed PRN     Other treatments have included:  Gayle Castelan has not been seen at a pain clinic in the past.   PT: Tried, massage was helpful  Relaxation techniques/biofeedback: none formal  Chiropractor: none  Acupuncture: none  TENs Unit: none  Injections: none    Side Effects: no  "side effect    Medications:  Current Outpatient Prescriptions   Medication Sig Dispense Refill     topiramate (TOPAMAX) 100 MG tablet Take 1.5 tablets (150 mg) by mouth 2 times daily 90 tablet 6     gabapentin (NEURONTIN) 300 MG capsule Take 2 capsules (600 mg) by mouth 3 times daily 180 capsule 5     HYDROcodone-acetaminophen (NORCO) 5-325 MG per tablet Take 1-2 tablets by mouth every 4 hours as needed for moderate to severe pain 10 tablet 0     butalbital-acetaminophen-caffeine (FIORICET/ESGIC) -40 MG CAPS per capsule Take 1 capsule by mouth every 4 hours as needed for headaches 60 capsule 0     Multiple Vitamins-Minerals (MULTIVITAMIN ADULT PO)        MAGNESIUM OXIDE PO Take 400 mg by mouth daily       Coenzyme Q10 (CO Q 10 PO) Take 1 tablet by mouth daily       LORazepam (ATIVAN) 1 MG tablet Take 0.5-1 tablets (0.5-1 mg) by mouth every 8 hours as needed for anxiety 30 tablet 1     Ondansetron HCl (ZOFRAN PO) Take by mouth as needed for nausea or vomiting       Citalopram Hydrobromide (CELEXA PO) Take 20 mg by mouth every evening        albuterol (ALBUTEROL) 108 (90 BASE) MCG/ACT inhaler Inhale 2 puffs into the lungs every 4 hours as needed for shortness of breath / dyspnea 1 Inhaler 0       Medical History: any changes in medical history since they were last seen? Patient felt extremely ill last month and went to the ED and was given some antibiotics.  She has fully recovered now and the illness was self-limiting.      Review of Systems:  The 14 system ROS was reviewed from the intake questionnaire, and is positive for: headaches and migraines.  Any bowel or bladder problems: no  Mood: none    Physical Exam:  Blood pressure 102/67, pulse 66, height 1.6 m (5' 3\"), weight 62.1 kg (137 lb), not currently breastfeeding.  General: awake, alert  Gait: Normal  MSK exam: deferred for exam    Assessment:   1. Chronic migraine headache without aura  2. Psychosocial stressors      Plan:  1. Physical Therapy:  " Discussed with patient however patient is not interested at this time.  2. Clinical Health Psychologist to address issues of relaxation, behavioral change, coping style, and other factors important to improvement.  Patient states she is currently undergoing mental health treatment.  3. Diagnostic Studies:  none  4. Medication Management:    1. Discussed better range of acetaminophen (tylenol) a  2. Discussed appropriate dosing of ibuprofen  3. Will try caffeine at the onset of headache  5. Further procedures recommended: none  6. Recommendations to PCP: none  7. Follow up: 3-4 months, coco in 2 months.    I saw and examined the patient with the Pain Fellow/Resident. I have reviewed and agree with the resident's note and plan of care and made changes and corrections directly to the body of the note.    TIME SPENT:  BY FELLOW/RESIDENT ALONE 15 MIN  BY MYSELF AND FELLOW/RESIDENT TOGETHER 0 MIN  BY MYSELF WITHOUT THE FELLOW/RESIDENT 25 MIN    These times included 15 minutes I spent counseling her about her diagnosis and treatment options and coordination of care with the primary team    Tamra Rojas MD  [facility]

## 2017-09-18 NOTE — MR AVS SNAPSHOT
After Visit Summary   9/18/2017    Gayle Castelan    MRN: [id number]           Patient Information     Date Of Birth          1975        Visit Information        Provider Department      9/18/2017 8:30 AM Aurora Rojas MD [facility]        Care Instructions    1. When you take a dose of acetaminophen, you can use 650mg up to 1000mg.  Max dose daily is 3000mg.    2. You can take up to 800mg per dose of ibuprofen.  Take with food.  Max daily dose of ibuprofen is 2400mg.    3. Try caffeine at onset of headache.  4. Schedule in 3-4 months, but mychart in ~2 months with update.    ----------------------------------------------------------------  Nurse Triage line:  [phone]   Call this number with any questions or concerns. You may leave a detailed message anytime. Calls are typically returned Monday through Friday between 8 AM and 4:30 PM. We usually get back to you within 2 business days depending on the issue/request.       Medication refills:    For non-narcotic medications, call your pharmacy directly to request a refill. The pharmacy will contact the [facility] for authorization. Please allow 3-4 days for these refills to be processed.     For narcotic refills, call the nurse triage line or send a surespothart message. Please contact us 7-10 days before your refill is due. The message MUST include the name of the specific medication(s) requested and how you would like to receive the prescription(s). The options are as follows:    Pain Clinic staff can mail the prescription to your pharmacy. Please tell us the name of the pharmacy.    You may pick the prescription up at the Pain Clinic (tell us the location) or during a clinic visit with your pain provider    Pain Clinic staff can deliver the prescription to the [place] pharmacy in the clinic building. Please tell us the location.      Scheduling number: [phone].  Call this number to schedule or change  "appointments.    We believe regular attendance is key to your success in our program.    Any time you are unable to keep your appointment we ask that you call us at least 24 hours in advance to let us know. This will allow us to offer the appointment time to another patient.               Follow-ups after your visit        Who to contact     If you have questions or need follow up information about today's clinic visit or your schedule please contact [facility] directly at [phone].  Normal or non-critical lab and imaging results will be communicated to you by Straphart, letter or phone within 4 business days after the clinic has received the results. If you do not hear from us within 7 days, please contact the clinic through [employer] or phone. If you have a critical or abnormal lab result, we will notify you by phone as soon as possible.  Submit refill requests through [employer] or call your pharmacy and they will forward the refill request to us. Please allow 3 business days for your refill to be completed.          Additional Information About Your Visit        Really Simple Information     [employer] gives you secure access to your electronic health record. If you see a primary care provider, you can also send messages to your care team and make appointments. If you have questions, please call your primary care clinic.  If you do not have a primary care provider, please call [phone] and they will assist you.        Care EveryWhere ID     This is your Care EveryWhere ID. This could be used by other organizations to access your [facility] medical records  [id number]        Your Vitals Were     Pulse Height BMI (Body Mass Index)             66 1.6 m (5' 3\") 24.27 kg/m2          Blood Pressure from Last 3 Encounters:   09/18/17 102/67   06/28/17 114/76   06/01/17 96/59    Weight from Last 3 Encounters:   09/18/17 62.1 kg (137 lb)   06/28/17 63.5 kg (140 lb)   06/01/17 65.8 kg (145 lb)              Today, you " had the following     No orders found for display         Today's Medication Changes          These changes are accurate as of: 9/18/17  8:56 AM.  If you have any questions, ask your nurse or doctor.               Stop taking these medicines if you haven't already. Please contact your care team if you have questions.     butalbital-acetaminophen-caffeine -40 MG Caps per capsule   Commonly known as:  FIORICET/ESGIC                    Primary Care Provider Office Phone # Fax #    [facility] [phone]       [address] [place] [id number]        Equal Access to Services     [facility] : Hadii ana paula davila hadashduran Sokaya, waaxda luqadaha, qaybta kaalmajenniffer palacios, tanya gamez . So [facility] [phone].    ATENCIÓN: Si habla español, tiene a rivera disposición servicios gratuitos de asistencia lingüística. [facility] [phone].    We comply with applicable federal civil rights laws and [place] laws. We do not discriminate on the basis of race, color, national origin, age, disability sex, sexual orientation or gender identity.            Thank you!     Thank you for choosing [facility]3]  for your care. Our goal is always to provide you with excellent care. Hearing back from our patients is one way we can continue to improve our services. Please take a few minutes to complete the written survey that you may receive in the mail after your visit with us. Thank you!             Your Updated Medication List - Protect others around you: Learn how to safely use, store and throw away your medicines at www.disposemymeds.org.          This list is accurate as of: 9/18/17  8:56 AM.  Always use your most recent med list.                   Brand Name Dispense Instructions for use Diagnosis    albuterol 108 (90 BASE) MCG/ACT Inhaler    PROAIR HFA    1 Inhaler    Inhale 2 puffs into the lungs every 4 hours as needed for shortness of breath / dyspnea         CELEXA PO      Take 20 mg by mouth every evening        CO Q 10 PO      Take 1 tablet by mouth daily        gabapentin 300 MG capsule    NEURONTIN    180 capsule    Take 2 capsules (600 mg) by mouth 3 times daily        HYDROcodone-acetaminophen 5-325 MG per tablet    NORCO    10 tablet    Take 1-2 tablets by mouth every 4 hours as needed for moderate to severe pain    Other chronic pain       LORazepam 1 MG tablet    ATIVAN    30 tablet    Take 0.5-1 tablets (0.5-1 mg) by mouth every 8 hours as needed for anxiety    Anxiety       MAGNESIUM OXIDE PO      Take 400 mg by mouth daily        MULTIVITAMIN ADULT PO           topiramate 100 MG tablet    TOPAMAX    90 tablet    Take 1.5 tablets (150 mg) by mouth 2 times daily    Intractable chronic migraine without aura and without status migrainosus       ZOFRAN PO      Take by mouth as needed for nausea or vomiting

## 2017-09-18 NOTE — NURSING NOTE
"Chief Complaint   Patient presents with     Pain     Migraine       Initial /67  Pulse 66  Ht 1.6 m (5' 3\")  Wt 62.1 kg (137 lb)  BMI 24.27 kg/m2 Estimated body mass index is 24.27 kg/(m^2) as calculated from the following:    Height as of this encounter: 1.6 m (5' 3\").    Weight as of this encounter: 62.1 kg (137 lb).  Medication Reconciliation: complete     Keren Lugo MA      "

## 2018-07-03 ENCOUNTER — OFFICE VISIT (OUTPATIENT)
Dept: NEUROLOGY | Facility: CLINIC | Age: 43
End: 2018-07-03
Payer: COMMERCIAL

## 2018-07-03 VITALS
TEMPERATURE: 97 F | DIASTOLIC BLOOD PRESSURE: 52 MMHG | HEART RATE: 69 BPM | RESPIRATION RATE: 12 BRPM | BODY MASS INDEX: 23.31 KG/M2 | SYSTOLIC BLOOD PRESSURE: 97 MMHG | WEIGHT: 131.6 LBS

## 2018-07-03 DIAGNOSIS — R51.9 CHRONIC DAILY HEADACHE: Primary | ICD-10-CM

## 2018-07-03 DIAGNOSIS — Z82.0 FAMILY HISTORY OF DISORDER OF BRAIN: ICD-10-CM

## 2018-07-03 DIAGNOSIS — G43.009 MIGRAINE WITHOUT AURA AND WITHOUT STATUS MIGRAINOSUS, NOT INTRACTABLE: ICD-10-CM

## 2018-07-03 PROCEDURE — 99214 OFFICE O/P EST MOD 30 MIN: CPT | Performed by: PSYCHIATRY & NEUROLOGY

## 2018-07-03 NOTE — LETTER
7/3/2018         RE: Gayle Castelan  [address]  [facility] [address]        Dear Colleague,    Thank you for referring your patient, Gayle Castelan, to the [facility]. Please see a copy of my visit note below.    ESTABLISHED PATIENT NEUROLOGY NOTE    DATE OF VISIT: 7/3/2018  MRN: [id number]  PATIENT NAME: Gayle Castelan  YOB: 1975    Chief Complaint   Patient presents with     Headache     recheck     SUBJECTIVE:                                                      HISTORY OF PRESENT ILLNESS:  Gayle is here for follow up regarding headaches. The patient has history of chronic daily headaches and migraine. I have not seen her for almost 1.5 years, as I had referred the patient on to pain clinic. When we met in 2.2017, she had recently seen Dr. Rojas. The plan then was to make an increase in her Topamax dose to 150mg BID (this medication had proven helpful with her headaches in the past) and to continue gabapentin. She was using Fioricet and Norco as needed (but not >2 times per week) at that time as well. Botox was being considered for future management. I instructed the patient to try to further minimize the Fioricet and Norco. At follow-up in pain clinic in 6.2017, they also discussed physical therapy and recommended gayle see the clinical health Psychologist for relaxation techniques/coping. The notes from pain clinic end in 9.2017. At that time recommendation was made for caffeine at headache onset.     The patient tells me that she is here because her daughter was recently diagnosed with  Chiari malformation (11mm protrusion, other details unclear except that she has been asymptomatic). Gayle is nervous and wonders if she could have the same problem. Head CT was normal in 2016 (see below).     The patient says that her headaches are the same. She says that she She continues to take Topamax 150mg BID and Gabapentin 600mg TID. She says that she has been getting iron infusions for  anemia and has seen a hematologist for very low ferritin.     She says that she is getting her Topamax and gabapentin through her primary care provider at this point. The patient says that the Botox was denied by her insurance and as a result, she was told that she did not need to return to pain clinic unless the headaches changed.    She did have a migraine over this past weekend with the hot weather. She says that she has been retrying rizatriptan for these and it does seem to help. Ice and rest also help. Her more-frequent headaches were bitemporally located, but these are no longer occurring daily.     No visual changes. No pain behind the eyes or with eye movement. No changes in her headaches. No pain with chewing or swallowing.    The patient is here with her father who asks about Gayle's Left eye protruding a little more than the Left, at times, from his view. He asks about thyroid function testing for this.     CURRENT MEDICATIONS:     Current Outpatient Prescriptions on File Prior to Visit:  albuterol (ALBUTEROL) 108 (90 BASE) MCG/ACT inhaler Inhale 2 puffs into the lungs every 4 hours as needed for shortness of breath / dyspnea   Citalopram Hydrobromide (CELEXA PO) Take 20 mg by mouth every evening    gabapentin (NEURONTIN) 300 MG capsule Take 2 capsules (600 mg) by mouth 3 times daily   HYDROcodone-acetaminophen (NORCO) 5-325 MG per tablet Take 1-2 tablets by mouth every 4 hours as needed for moderate to severe pain   LORazepam (ATIVAN) 1 MG tablet Take 0.5-1 tablets (0.5-1 mg) by mouth every 8 hours as needed for anxiety   Multiple Vitamins-Minerals (MULTIVITAMIN ADULT PO)    Ondansetron HCl (ZOFRAN PO) Take by mouth as needed for nausea or vomiting   topiramate (TOPAMAX) 100 MG tablet Take 1.5 tablets (150 mg) by mouth 2 times daily     No current facility-administered medications on file prior to visit.     RECENT DIAGNOSTIC STUDIES:    Results for orders placed or performed during the hospital encounter  of 06/01/17   Clostridium difficile toxin B PCR   Result Value Ref Range    Specimen Description Feces     C Diff Toxin B PCR  NEG     Negative  Negative: Clostridium difficile target DNA sequences NOT detected, presumed   negative for Clostridium difficile toxin B or the number of bacteria present   may be below the limit of detection for the test.   FDA approved assay performed using [employer] GeneXpert real-time PCR.   A negative result does not exclude actual disease due to Clostridium difficile   and may be due to improper collection, handling and storage of the specimen or   the number of organisms in the specimen is below the detection limit of the   assay.     Enteric Bacteria and Virus Panel by [employer] Stool   Result Value Ref Range    Campylobacter group by [employer] Not Detected NDET    Salmonella species by [employer] Not Detected NDET    Shigella species by [employer] Not Detected NDET    Vibrio group by [employer] Not Detected NDET    Rotavirus A by [employer] Not Detected NDET    Shiga toxin 1 gene by [employer]IT[employer] Not Detected NDET    Shiga toxin 2 gene by [employer] Not Detected NDET    Norovirus I and II by [employer] Not Detected NDET    Yersinia enterocolitica by [employer] Not Detected NDET    Enteric pathogen comment       Testing performed by multiplexed, qualitative PCR using the [employer]   Enteric Pathogens Nucleic Acid Test. Results should not be used as the sole   basis for diagnosis, treatment, or other patient management decisions.   Positive results do not rule out co-infection with other organisms that are not   detected by this test, and may not be the sole or definitive cause of patient   illness.   Negative results in the setting of clinical illness compatible with   gastroenteritis may be due to infection by pathogens that are not detected by   this test or non-infectious causes such as ulcerative colitis, irritable bowel   syndrome, or Crohn's disease.   Note: Shiga toxin producing E. coli (STEC) typically harbor one or both genes   that  encode for Shiga toxins 1 and 2.       Imaging:  Head CT (9.25.16):  FINDINGS:  The ventricles are normal in size, shape and configuration.   The brain parenchyma and subarachnoid spaces are normal. There is no  evidence of intracranial hemorrhage, mass, acute infarct or anomaly.      The visualized portions of the sinuses and mastoids appear normal.  There is no evidence of trauma.      IMPRESSION: Normal CT scan of the head.    Imaging reviewed by me. Agree with radiology read.     REVIEW OF SYSTEMS:                                                      10-point review of systems is negative except as mentioned above in HPI.     EXAM:                                                      Physical Exam:   Vitals: BP 97/52 (BP Location: Right arm, Patient Position: Sitting, Cuff Size: Adult Regular)  Pulse 69  Temp 97  F (36.1  C) (Tympanic)  Resp 12  Wt 59.7 kg (131 lb 9.6 oz)  BMI 23.31 kg/m2  BMI= Body mass index is 23.31 kg/(m^2).  GENERAL: NAD.   HEENT: Mild TTP of posterior neck.  Focused Neurologic:  MENTAL STATUS: Alert, attentive. Speech is fluent. Normal comprehension. Normal concentration. Adequate fund of knowledge.   CRANIAL NERVES: Discs flat. Visual fields intact to confrontation. Pupils equally, round and reactive to light. Facial sensation and movement normal. EOM full. Left eye does appear slightly more prominent (protruding) than the Right. Hearing intact to conversation. Trapezius strength intact. Palate moves symmetrically. Tongue midline.  MOTOR: 5/5 in proximal and distal muscle groups of upper and lower extremities with brief testing. Tone and bulk normal.   DTRs: Intact and symmetric.   SENSATION: Normal light touch throughout.   COORDINATION: Finger tapping normal.  STATION AND GAIT: Gait is normal.  CV: RRR. S1, S2.   NECK: No bruits.    ASSESSMENT and PLAN:                                                      Assessment and Plan:    ICD-10-CM    1. Chronic daily headache R51 MR Brain  w/o & w Contrast   2. Migraine without aura and without status migrainosus, not intractable G43.009 MR Brain w/o & w Contrast   3. Family history of disorder of brain Z82.0 MR Brain w/o & w Contrast        Ms. Castelan is a pleasant 43 yo woman with a long history of chronic daily headaches and migraine headaches here mainly to discuss head imaging in light of her daughter's recent diagnosis. We discussed the nature of and symptoms associated with Chiari malformations. The patient continues to have her usually headache and though the normal head CT is reassuring, I have agreed to order a brain MRI to rule out any structural lesions/abnormalities in Gayle.     Because her primary care provider is currently managing the headaches, we will have her return on a as needed basis. I did encourage her to talk to her primary care provider about the concern regarding exophthalmos, her eyes are mildly asymmetric in terms of protrusion but I do not think this represents any pathology in the setting of no other symptoms.     The patient understands and agrees with the plan.     Patient Instructions:  MRI brain. We will notify you of the results.   Continue current headache treatment plan through your primary care provider.   I do recommend you get in to have your eyes checked - and continue this annually.   Feel free to return to clinic if there is any change or worsening of the headaches.     Total Time: 25 minutes were spent with the patient. More than 50% of the time spent on counseling (as described above in Assessment and Plan) /coordinating the care.    Dorothy Chavez MD  Neurology                    Again, thank you for allowing me to participate in the care of your patient.        Sincerely,        Dorothy Chavez MD

## 2018-07-03 NOTE — PROGRESS NOTES
ESTABLISHED PATIENT NEUROLOGY NOTE    DATE OF VISIT: 7/3/2018  MRN: [id number]  PATIENT NAME: Gayle Castelan  YOB: 1975    Chief Complaint   Patient presents with     Headache     recheck     SUBJECTIVE:                                                      HISTORY OF PRESENT ILLNESS:  Gayle is here for follow up regarding headaches. The patient has history of chronic daily headaches and migraine. I have not seen her for almost 1.5 years, as I had referred the patient on to pain clinic. When we met in 2.2017, she had recently seen Dr. Rojas. The plan then was to make an increase in her Topamax dose to 150mg BID (this medication had proven helpful with her headaches in the past) and to continue gabapentin. She was using Fioricet and Norco as needed (but not >2 times per week) at that time as well. Botox was being considered for future management. I instructed the patient to try to further minimize the Fioricet and Norco. At follow-up in pain clinic in 6.2017, they also discussed physical therapy and recommended gayle see the clinical health Psychologist for relaxation techniques/coping. The notes from pain clinic end in 9.2017. At that time recommendation was made for caffeine at headache onset.     The patient tells me that she is here because her daughter was recently diagnosed with  Chiari malformation (11mm protrusion, other details unclear except that she has been asymptomatic). Gayle is nervous and wonders if she could have the same problem. Head CT was normal in 2016 (see below).     The patient says that her headaches are the same. She says that she She continues to take Topamax 150mg BID and Gabapentin 600mg TID. She says that she has been getting iron infusions for anemia and has seen a hematologist for very low ferritin.     She says that she is getting her Topamax and gabapentin through her primary care provider at this point. The patient says that the Botox was denied by her insurance and as a  result, she was told that she did not need to return to pain clinic unless the headaches changed.    She did have a migraine over this past weekend with the hot weather. She says that she has been retrying rizatriptan for these and it does seem to help. Ice and rest also help. Her more-frequent headaches were bitemporally located, but these are no longer occurring daily.     No visual changes. No pain behind the eyes or with eye movement. No changes in her headaches. No pain with chewing or swallowing.    The patient is here with her father who asks about Gayle's Left eye protruding a little more than the Left, at times, from his view. He asks about thyroid function testing for this.     CURRENT MEDICATIONS:     Current Outpatient Prescriptions on File Prior to Visit:  albuterol (ALBUTEROL) 108 (90 BASE) MCG/ACT inhaler Inhale 2 puffs into the lungs every 4 hours as needed for shortness of breath / dyspnea   Citalopram Hydrobromide (CELEXA PO) Take 20 mg by mouth every evening    gabapentin (NEURONTIN) 300 MG capsule Take 2 capsules (600 mg) by mouth 3 times daily   HYDROcodone-acetaminophen (NORCO) 5-325 MG per tablet Take 1-2 tablets by mouth every 4 hours as needed for moderate to severe pain   LORazepam (ATIVAN) 1 MG tablet Take 0.5-1 tablets (0.5-1 mg) by mouth every 8 hours as needed for anxiety   Multiple Vitamins-Minerals (MULTIVITAMIN ADULT PO)    Ondansetron HCl (ZOFRAN PO) Take by mouth as needed for nausea or vomiting   topiramate (TOPAMAX) 100 MG tablet Take 1.5 tablets (150 mg) by mouth 2 times daily     No current facility-administered medications on file prior to visit.     RECENT DIAGNOSTIC STUDIES:    Results for orders placed or performed during the hospital encounter of 06/01/17   Clostridium difficile toxin B PCR   Result Value Ref Range    Specimen Description Feces     C Diff Toxin B PCR  NEG     Negative  Negative: Clostridium difficile target DNA sequences NOT detected, presumed   negative for  Clostridium difficile toxin B or the number of bacteria present   may be below the limit of detection for the test.   FDA approved assay performed using [employer] GeneXpert real-time PCR.   A negative result does not exclude actual disease due to Clostridium difficile   and may be due to improper collection, handling and storage of the specimen or   the number of organisms in the specimen is below the detection limit of the   assay.     Enteric Bacteria and Virus Panel by [employer] Stool   Result Value Ref Range    Campylobacter group by [employer]IT[employer] Not Detected NDET    Salmonella species by [employer] Not Detected NDET    Shigella species by [employer] Not Detected NDET    Vibrio group by [employer] Not Detected NDET    Rotavirus A by [employer] Not Detected NDET    Shiga toxin 1 gene by [employer] Not Detected NDET    Shiga toxin 2 gene by [employer] Not Detected NDET    Norovirus I and II by [employer] Not Detected NDET    Yersinia enterocolitica by [employer]ITO Not Detected NDET    Enteric pathogen comment       Testing performed by multiplexed, qualitative PCR using the [employer] IVFXPERTigene   Enteric Pathogens Nucleic Acid Test. Results should not be used as the sole   basis for diagnosis, treatment, or other patient management decisions.   Positive results do not rule out co-infection with other organisms that are not   detected by this test, and may not be the sole or definitive cause of patient   illness.   Negative results in the setting of clinical illness compatible with   gastroenteritis may be due to infection by pathogens that are not detected by   this test or non-infectious causes such as ulcerative colitis, irritable bowel   syndrome, or Crohn's disease.   Note: Shiga toxin producing E. coli (STEC) typically harbor one or both genes   that encode for Shiga toxins 1 and 2.       Imaging:  Head CT (9.25.16):  FINDINGS:  The ventricles are normal in size, shape and configuration.   The brain parenchyma and subarachnoid spaces are normal. There is no  evidence of intracranial  hemorrhage, mass, acute infarct or anomaly.      The visualized portions of the sinuses and mastoids appear normal.  There is no evidence of trauma.      IMPRESSION: Normal CT scan of the head.    Imaging reviewed by me. Agree with radiology read.     REVIEW OF SYSTEMS:                                                      10-point review of systems is negative except as mentioned above in HPI.     EXAM:                                                      Physical Exam:   Vitals: BP 97/52 (BP Location: Right arm, Patient Position: Sitting, Cuff Size: Adult Regular)  Pulse 69  Temp 97  F (36.1  C) (Tympanic)  Resp 12  Wt 59.7 kg (131 lb 9.6 oz)  BMI 23.31 kg/m2  BMI= Body mass index is 23.31 kg/(m^2).  GENERAL: NAD.   HEENT: Mild TTP of posterior neck.  Focused Neurologic:  MENTAL STATUS: Alert, attentive. Speech is fluent. Normal comprehension. Normal concentration. Adequate fund of knowledge.   CRANIAL NERVES: Discs flat. Visual fields intact to confrontation. Pupils equally, round and reactive to light. Facial sensation and movement normal. EOM full. Left eye does appear slightly more prominent (protruding) than the Right. Hearing intact to conversation. Trapezius strength intact. Palate moves symmetrically. Tongue midline.  MOTOR: 5/5 in proximal and distal muscle groups of upper and lower extremities with brief testing. Tone and bulk normal.   DTRs: Intact and symmetric.   SENSATION: Normal light touch throughout.   COORDINATION: Finger tapping normal.  STATION AND GAIT: Gait is normal.  CV: RRR. S1, S2.   NECK: No bruits.    ASSESSMENT and PLAN:                                                      Assessment and Plan:    ICD-10-CM    1. Chronic daily headache R51 MR Brain w/o & w Contrast   2. Migraine without aura and without status migrainosus, not intractable G43.009 MR Brain w/o & w Contrast   3. Family history of disorder of brain Z82.0 MR Brain w/o & w Contrast        Ms. Castelan is a pleasant 43 yo  woman with a long history of chronic daily headaches and migraine headaches here mainly to discuss head imaging in light of her daughter's recent diagnosis. We discussed the nature of and symptoms associated with Chiari malformations. The patient continues to have her usually headache and though the normal head CT is reassuring, I have agreed to order a brain MRI to rule out any structural lesions/abnormalities in Gayle.     Because her primary care provider is currently managing the headaches, we will have her return on a as needed basis. I did encourage her to talk to her primary care provider about the concern regarding exophthalmos, her eyes are mildly asymmetric in terms of protrusion but I do not think this represents any pathology in the setting of no other symptoms.     The patient understands and agrees with the plan.     Patient Instructions:  MRI brain. We will notify you of the results.   Continue current headache treatment plan through your primary care provider.   I do recommend you get in to have your eyes checked - and continue this annually.   Feel free to return to clinic if there is any change or worsening of the headaches.     Total Time: 25 minutes were spent with the patient. More than 50% of the time spent on counseling (as described above in Assessment and Plan) /coordinating the care.    Dorothy Chavez MD  Neurology

## 2018-07-03 NOTE — MR AVS SNAPSHOT
After Visit Summary   7/3/2018    Gayle Castelan    MRN: [id number]           Patient Information     Date Of Birth          1975        Visit Information        Provider Department      7/3/2018 8:00 AM Dorothy Chavez MD [facility]        Today's Diagnoses     Chronic daily headache    -  1    Migraine without aura and without status migrainosus, not intractable        Family history of disorder of brain          Care Instructions    Plan:    MRI brain. We will notify you of the results.   Continue current headache treatment plan through your primary care provider.   I do recommend you get in to have your eyes checked - and continue this annually.   Feel free to return to clinic if there is any change or worsening of the headaches.             Follow-ups after your visit        Follow-up notes from your care team     Return if symptoms worsen or fail to improve.      Future tests that were ordered for you today     Open Future Orders        Priority Expected Expires Ordered    MR Brain w/o & w Contrast Routine  7/3/2019 7/3/2018            Who to contact     If you have questions or need follow up information about today's clinic visit or your schedule please contact [facility] directly at [phone].  Normal or non-critical lab and imaging results will be communicated to you by [employer], letter or phone within 4 business days after the clinic has received the results. If you do not hear from us within 7 days, please contact the clinic through [employer] or phone. If you have a critical or abnormal lab result, we will notify you by phone as soon as possible.  Submit refill requests through [employer] or call your pharmacy and they will forward the refill request to us. Please allow 3 business days for your refill to be completed.          Additional Information About Your Visit        [employer] Information     [employer] gives you secure access to your electronic health  record. If you see a primary care provider, you can also send messages to your care team and make appointments. If you have questions, please call your primary care clinic.  If you do not have a primary care provider, please call [phone] and they will assist you.        Care EveryWhere ID     This is your Care EveryWhere ID. This could be used by other organizations to access your [facility] medical records  [id number]        Your Vitals Were     Pulse Temperature Respirations BMI (Body Mass Index)          69 97  F (36.1  C) (Tympanic) 12 23.31 kg/m2         Blood Pressure from Last 3 Encounters:   07/03/18 97/52   09/18/17 102/67   06/28/17 114/76    Weight from Last 3 Encounters:   07/03/18 59.7 kg (131 lb 9.6 oz)   09/18/17 62.1 kg (137 lb)   06/28/17 63.5 kg (140 lb)               Primary Care Provider Office Phone # Fax #    [facility] [phone]       [address] [place] [address]        Equal Access to Services     PARVIZ WISE : Hadii ana paula davila hadasho Sokaya, waaxda luqadaha, qaybta kaalmada philip, tanya nieves. So [facility] [phone].    ATENCIÓN: Si habla español, tiene a rivera disposición servicios gratuitos de asistencia lingüística. Kd al [phone].    We comply with applicable federal civil rights laws and [place] laws. We do not discriminate on the basis of race, color, national origin, age, disability, sex, sexual orientation, or gender identity.            Thank you!     Thank you for choosing [facility]  for your care. Our goal is always to provide you with excellent care. Hearing back from our patients is one way we can continue to improve our services. Please take a few minutes to complete the written survey that you may receive in the mail after your visit with us. Thank you!             Your Updated Medication List - Protect others around you: Learn how to safely use, store and throw away your  medicines at www.disposemymeds.org.          This list is accurate as of 7/3/18  8:30 AM.  Always use your most recent med list.                   Brand Name Dispense Instructions for use Diagnosis    albuterol 108 (90 Base) MCG/ACT Inhaler    PROAIR HFA    1 Inhaler    Inhale 2 puffs into the lungs every 4 hours as needed for shortness of breath / dyspnea        CELEXA PO      Take 20 mg by mouth every evening        gabapentin 300 MG capsule    NEURONTIN    180 capsule    Take 2 capsules (600 mg) by mouth 3 times daily        HYDROcodone-acetaminophen 5-325 MG per tablet    NORCO    10 tablet    Take 1-2 tablets by mouth every 4 hours as needed for moderate to severe pain    Other chronic pain       LORazepam 1 MG tablet    ATIVAN    30 tablet    Take 0.5-1 tablets (0.5-1 mg) by mouth every 8 hours as needed for anxiety    Anxiety       MULTIVITAMIN ADULT PO           topiramate 100 MG tablet    TOPAMAX    90 tablet    Take 1.5 tablets (150 mg) by mouth 2 times daily    Intractable chronic migraine without aura and without status migrainosus       ZOFRAN PO      Take by mouth as needed for nausea or vomiting

## 2018-07-03 NOTE — NURSING NOTE
Patient prefers to be contacted: Kaleb and letter  Okay to leave detailed message on voicemail: n/a    Domonique NEELY

## 2018-07-03 NOTE — PATIENT INSTRUCTIONS
Plan:    MRI brain. We will notify you of the results.   Continue current headache treatment plan through your primary care provider.   I do recommend you get in to have your eyes checked - and continue this annually.   Feel free to return to clinic if there is any change or worsening of the headaches.

## 2018-07-05 ENCOUNTER — HOSPITAL ENCOUNTER (OUTPATIENT)
Dept: MRI IMAGING | Facility: CLINIC | Age: 43
Discharge: HOME | End: 2018-07-05
Attending: PSYCHIATRY & NEUROLOGY | Admitting: PSYCHIATRY & NEUROLOGY
Payer: COMMERCIAL

## 2018-07-05 DIAGNOSIS — Z82.0 FAMILY HISTORY OF DISORDER OF BRAIN: ICD-10-CM

## 2018-07-05 DIAGNOSIS — G43.009 MIGRAINE WITHOUT AURA AND WITHOUT STATUS MIGRAINOSUS, NOT INTRACTABLE: ICD-10-CM

## 2018-07-05 DIAGNOSIS — R51.9 CHRONIC DAILY HEADACHE: ICD-10-CM

## 2018-07-05 PROCEDURE — 25000128 H RX IP 250 OP 636: Performed by: PSYCHIATRY & NEUROLOGY

## 2018-07-05 PROCEDURE — A9585 GADOBUTROL INJECTION: HCPCS | Performed by: PSYCHIATRY & NEUROLOGY

## 2018-07-05 PROCEDURE — 70553 MRI BRAIN STEM W/O & W/DYE: CPT

## 2018-07-05 RX ORDER — GADOBUTROL 604.72 MG/ML
6 INJECTION INTRAVENOUS ONCE
Status: COMPLETED | OUTPATIENT
Start: 2018-07-05 | End: 2018-07-05

## 2018-07-05 RX ADMIN — GADOBUTROL 6 ML: 604.72 INJECTION INTRAVENOUS at 19:02

## 2018-07-06 NOTE — PROGRESS NOTES
Please advise Gayle Castelan,  1975, that her brain MRI is normal. No evidence of Chiari malformation or any other structural abnormalities. [phone] (home) [phone] (work)  Dorothy Chavez

## 2018-12-09 ENCOUNTER — HOSPITAL ENCOUNTER (EMERGENCY)
Facility: CLINIC | Age: 43
Discharge: HOME | End: 2018-12-09
Attending: FAMILY MEDICINE | Admitting: FAMILY MEDICINE
Payer: COMMERCIAL

## 2018-12-09 VITALS
SYSTOLIC BLOOD PRESSURE: 115 MMHG | BODY MASS INDEX: 23.91 KG/M2 | RESPIRATION RATE: 16 BRPM | TEMPERATURE: 97.6 F | OXYGEN SATURATION: 100 % | WEIGHT: 135 LBS | HEART RATE: 66 BPM | DIASTOLIC BLOOD PRESSURE: 78 MMHG

## 2018-12-09 DIAGNOSIS — S69.92XA HAND INJURY, LEFT, INITIAL ENCOUNTER: ICD-10-CM

## 2018-12-09 PROCEDURE — G0463 HOSPITAL OUTPT CLINIC VISIT: HCPCS | Performed by: FAMILY MEDICINE

## 2018-12-09 PROCEDURE — 99214 OFFICE O/P EST MOD 30 MIN: CPT | Mod: Z6 | Performed by: FAMILY MEDICINE

## 2018-12-09 RX ORDER — MELOXICAM 15 MG/1
15 TABLET ORAL DAILY
Qty: 15 TABLET | Refills: 0 | Status: SHIPPED | OUTPATIENT
Start: 2018-12-09 | End: 2018-12-23

## 2018-12-09 NOTE — DISCHARGE INSTRUCTIONS
Symptoms most consistent with ligamental, tendon and muscle strain in the hand.     the Meloxicam and take daily with meals, while on this medication, avoid other nsaids (no aspirin, Naproxen, Advil, Alleve). The only over the counter medication you can take with this Meloxicam is Tylenol 1000mg three times a day as needed.    2 range of motion exercises for the hand.    Ice to area.

## 2018-12-09 NOTE — ED TRIAGE NOTES
Patient presents today with left hand injury . Symptoms started this morning . Arrived to urgent care ambulatory .

## 2018-12-09 NOTE — ED PROVIDER NOTES
History     Chief Complaint   Patient presents with     Hand Injury         HPI  Gayle Castelan is a 43 year old female who is brought in by father for evaluation of right hand pain.  Patient was in her usual state of health until around 10 AM this morning when her daughter accidentally kicked her in the left hand while they were playing.  She notes that the injury was a hyperextension shown one at the MCP joints.  Immediate excruciating pain but since then the pain has been intermittent and currently rated at 3/10.  Trying to extend the fingers fully cause pain.  No associated bruising or swelling over the finger.  Range of motion is limited because of the pain.  Of note she has injured this left hand before and is mostly worried because there was also some purplish discoloration at her fingertips after the injury.  She has tried thousand milligrams of Tylenol since the injury but this has not helped much.        Problem List:    Patient Active Problem List    Diagnosis Date Noted     Intractable chronic migraine without aura and without status migrainosus 08/10/2016     Priority: Medium     Encounter for long-term opiate analgesic use 08/10/2016     Priority: Medium     CARDIOVASCULAR SCREENING; LDL GOAL LESS THAN 160 10/31/2010     Priority: Medium     Bleeding unrelated to menstrual cycle 05/29/2009     Priority: Medium     Presence of other contraceptive device 05/21/2009     Priority: Medium     Essure coil placement    Follow up HSG shows bilateral tubal occlusion.          Past Medical History:    History reviewed. No pertinent past medical history.    Past Surgical History:    Past Surgical History:   Procedure Laterality Date     ABDOMEN SURGERY       COSMETIC MAMMOPLASTY AUGMENTATION BILATERAL       PROCEDURE OTHER - HIM SCAN      ESURE Coil     wisdom teeth         Family History:    Family History   Adopted: Yes   Family history unknown: Yes       Social History:  Marital Status:  Single [1]  Social  History     Tobacco Use     Smoking status: Never Smoker     Smokeless tobacco: Never Used   Substance Use Topics     Alcohol use: No     Drug use: No        Medications:      meloxicam (MOBIC) 15 MG tablet   albuterol (ALBUTEROL) 108 (90 BASE) MCG/ACT inhaler   Citalopram Hydrobromide (CELEXA PO)   gabapentin (NEURONTIN) 300 MG capsule   HYDROcodone-acetaminophen (NORCO) 5-325 MG per tablet   LORazepam (ATIVAN) 1 MG tablet   Multiple Vitamins-Minerals (MULTIVITAMIN ADULT PO)   Ondansetron HCl (ZOFRAN PO)   topiramate (TOPAMAX) 100 MG tablet         Review of Systems   Pertinent aspects as in the history.     Physical Exam   BP: 115/78  Pulse: 66  Temp: 97.6  F (36.4  C)  Resp: 16  Weight: 61.2 kg (135 lb)  SpO2: 100 %      Physical Exam   General - Awake and alert, not in any obvious distress. Afebrile, not pale well hydrated.  Upper Extremities -no edema or deformities. Radial pulses strong bilaterally.  Right hand-essentially normal  Left hand- tenderness on palpation over the fourth metacarpal region, cautious though good range of motion at the joints of the hand.  She has some blackish discoloration to the tips of her fingers which involves this again otherwise she has good capillary refill in fingers look normal.  Psych - Judgment and mental status are clear, patient has reasonable insight. Mood is stable.            ED Course        Procedures                 No results found for this or any previous visit (from the past 24 hour(s)).    Medications - No data to display    Assessments & Plan (with Medical Decision Making)     I have reviewed the nursing notes.    I have reviewed the findings, diagnosis, plan and need for follow up with the patient.    START taking      Dose / Directions   meloxicam 15 MG tablet  Commonly known as:  MOBIC      Dose:  15 mg  Take 1 tablet (15 mg) by mouth daily for 14 days  Quantity:  15 tablet  Refills:  0           Where to get your medicines      These medications were sent  to [facility] [place], [place] - [address]  [address] [address]    Phone:  [phone]     meloxicam 15 MG tablet           Final diagnoses:   Hand injury, left, initial encounter     Diagnosis, differential diagnosis, treatment and prognosis discussed with patient  Patient notes that she usually takes ibuprofen on a consistent basis and is not sure this will help someone in a prescription for something else.  Meloxicam sent to the pharmacy-medication including potential side effects reviewed with patient.    See below for summary of discussion(s) with patient    Symptoms most consistent with ligamental, tendon and muscle strain in the hand.     the Meloxicam and take daily with meals, while on this medication, avoid other nsaids (no aspirin, Naproxen, Advil, Alleve). The only over the counter medication you can take with this Meloxicam is Tylenol 1000mg three times a day as needed.    2 range of motion exercises for the hand.    Ice to area.    12/9/2018   [facility] EMERGENCY DEPARTMENT     Paula Toth MD  12/09/18 [id number]

## 2018-12-09 NOTE — ED AVS SNAPSHOT
[facility] Emergency Department  [address] [address]  Phone:  [phone]  Fax:  [phone]                                    Gayle Castelan   MRN: [id number]    Department:  [facility] Emergency Department   Date of Visit:  12/9/2018           After Visit Summary Signature Page    I have received my discharge instructions, and my questions have been answered. I have discussed any challenges I see with this plan with the nurse or doctor.    ..........................................................................................................................................  Patient/Patient Representative Signature      ..........................................................................................................................................  Patient Representative Print Name and Relationship to Patient    ..................................................               ................................................  Date                                   Time    ..........................................................................................................................................  Reviewed by Signature/Title    ...................................................              ..............................................  Date                                               Time          22EPIC Rev 08/18

## 2019-11-03 ENCOUNTER — HEALTH MAINTENANCE LETTER (OUTPATIENT)
Age: 44
End: 2019-11-03

## 2020-11-16 ENCOUNTER — HEALTH MAINTENANCE LETTER (OUTPATIENT)
Age: 45
End: 2020-11-16

## 2021-09-18 ENCOUNTER — HEALTH MAINTENANCE LETTER (OUTPATIENT)
Age: 46
End: 2021-09-18

## 2021-11-13 ENCOUNTER — HEALTH MAINTENANCE LETTER (OUTPATIENT)
Age: 46
End: 2021-11-13

## 2022-01-08 ENCOUNTER — HEALTH MAINTENANCE LETTER (OUTPATIENT)
Age: 47
End: 2022-01-08

## 2022-11-19 ENCOUNTER — HEALTH MAINTENANCE LETTER (OUTPATIENT)
Age: 47
End: 2022-11-19

## 2023-04-09 ENCOUNTER — HEALTH MAINTENANCE LETTER (OUTPATIENT)
Age: 48
End: 2023-04-09

## 2023-08-03 ENCOUNTER — HOSPITAL ENCOUNTER (EMERGENCY)
Facility: CLINIC | Age: 48
Discharge: HOME | End: 2023-08-03
Attending: EMERGENCY MEDICINE | Admitting: EMERGENCY MEDICINE
Payer: COMMERCIAL

## 2023-08-03 VITALS
OXYGEN SATURATION: 99 % | RESPIRATION RATE: 18 BRPM | WEIGHT: 130 LBS | BODY MASS INDEX: 23.04 KG/M2 | HEIGHT: 63 IN | DIASTOLIC BLOOD PRESSURE: 46 MMHG | SYSTOLIC BLOOD PRESSURE: 97 MMHG | HEART RATE: 70 BPM | TEMPERATURE: 98 F

## 2023-08-03 DIAGNOSIS — R11.2 NAUSEA VOMITING AND DIARRHEA: ICD-10-CM

## 2023-08-03 DIAGNOSIS — R19.7 NAUSEA VOMITING AND DIARRHEA: ICD-10-CM

## 2023-08-03 DIAGNOSIS — E86.0 DEHYDRATION: ICD-10-CM

## 2023-08-03 LAB
ALBUMIN SERPL BCG-MCNC: 4.7 G/DL (ref 3.5–5.2)
ALP SERPL-CCNC: 90 U/L (ref 35–104)
ALT SERPL W P-5'-P-CCNC: 29 U/L (ref 0–50)
ANION GAP SERPL CALCULATED.3IONS-SCNC: 17 MMOL/L (ref 7–15)
AST SERPL W P-5'-P-CCNC: 22 U/L (ref 0–45)
BASOPHILS # BLD AUTO: 0.1 10E3/UL (ref 0–0.2)
BASOPHILS NFR BLD AUTO: 1 %
BILIRUB SERPL-MCNC: 0.4 MG/DL
BUN SERPL-MCNC: 9.9 MG/DL (ref 6–20)
CALCIUM SERPL-MCNC: 9.7 MG/DL (ref 8.6–10)
CHLORIDE SERPL-SCNC: 107 MMOL/L (ref 98–107)
CREAT SERPL-MCNC: 1.1 MG/DL (ref 0.51–0.95)
DEPRECATED HCO3 PLAS-SCNC: 18 MMOL/L (ref 22–29)
EOSINOPHIL # BLD AUTO: 0.2 10E3/UL (ref 0–0.7)
EOSINOPHIL NFR BLD AUTO: 2 %
ERYTHROCYTE [DISTWIDTH] IN BLOOD BY AUTOMATED COUNT: 13 % (ref 10–15)
GFR SERPL CREATININE-BSD FRML MDRD: 62 ML/MIN/1.73M2
GLUCOSE SERPL-MCNC: 111 MG/DL (ref 70–99)
HCT VFR BLD AUTO: 51.1 % (ref 35–47)
HGB BLD-MCNC: 17.2 G/DL (ref 11.7–15.7)
IMM GRANULOCYTES # BLD: 0 10E3/UL
IMM GRANULOCYTES NFR BLD: 0 %
LYMPHOCYTES # BLD AUTO: 0.7 10E3/UL (ref 0.8–5.3)
LYMPHOCYTES NFR BLD AUTO: 8 %
MCH RBC QN AUTO: 32.2 PG (ref 26.5–33)
MCHC RBC AUTO-ENTMCNC: 33.7 G/DL (ref 31.5–36.5)
MCV RBC AUTO: 96 FL (ref 78–100)
MONOCYTES # BLD AUTO: 0.8 10E3/UL (ref 0–1.3)
MONOCYTES NFR BLD AUTO: 9 %
NEUTROPHILS # BLD AUTO: 7.2 10E3/UL (ref 1.6–8.3)
NEUTROPHILS NFR BLD AUTO: 80 %
NRBC # BLD AUTO: 0 10E3/UL
NRBC BLD AUTO-RTO: 0 /100
PLATELET # BLD AUTO: 277 10E3/UL (ref 150–450)
POTASSIUM SERPL-SCNC: 3.5 MMOL/L (ref 3.4–5.3)
PROT SERPL-MCNC: 7.1 G/DL (ref 6.4–8.3)
RBC # BLD AUTO: 5.34 10E6/UL (ref 3.8–5.2)
SODIUM SERPL-SCNC: 142 MMOL/L (ref 136–145)
WBC # BLD AUTO: 8.8 10E3/UL (ref 4–11)

## 2023-08-03 PROCEDURE — 258N000003 HC RX IP 258 OP 636: Performed by: EMERGENCY MEDICINE

## 2023-08-03 PROCEDURE — 96374 THER/PROPH/DIAG INJ IV PUSH: CPT | Performed by: EMERGENCY MEDICINE

## 2023-08-03 PROCEDURE — 36415 COLL VENOUS BLD VENIPUNCTURE: CPT | Performed by: EMERGENCY MEDICINE

## 2023-08-03 PROCEDURE — 99284 EMERGENCY DEPT VISIT MOD MDM: CPT | Mod: 25 | Performed by: EMERGENCY MEDICINE

## 2023-08-03 PROCEDURE — 80053 COMPREHEN METABOLIC PANEL: CPT | Performed by: EMERGENCY MEDICINE

## 2023-08-03 PROCEDURE — 99284 EMERGENCY DEPT VISIT MOD MDM: CPT | Performed by: EMERGENCY MEDICINE

## 2023-08-03 PROCEDURE — 85025 COMPLETE CBC W/AUTO DIFF WBC: CPT | Performed by: EMERGENCY MEDICINE

## 2023-08-03 PROCEDURE — 96361 HYDRATE IV INFUSION ADD-ON: CPT | Performed by: EMERGENCY MEDICINE

## 2023-08-03 PROCEDURE — 96375 TX/PRO/DX INJ NEW DRUG ADDON: CPT | Performed by: EMERGENCY MEDICINE

## 2023-08-03 PROCEDURE — 250N000011 HC RX IP 250 OP 636: Mod: JZ | Performed by: EMERGENCY MEDICINE

## 2023-08-03 RX ORDER — METOCLOPRAMIDE HYDROCHLORIDE 5 MG/ML
10 INJECTION INTRAMUSCULAR; INTRAVENOUS ONCE
Status: DISCONTINUED | OUTPATIENT
Start: 2023-08-03 | End: 2023-08-03

## 2023-08-03 RX ORDER — HYDROXYZINE PAMOATE 25 MG/1
25-50 CAPSULE ORAL 4 TIMES DAILY PRN
Qty: 30 CAPSULE | Refills: 0 | Status: SHIPPED | OUTPATIENT
Start: 2023-08-03

## 2023-08-03 RX ORDER — ONDANSETRON 2 MG/ML
4 INJECTION INTRAMUSCULAR; INTRAVENOUS ONCE
Status: COMPLETED | OUTPATIENT
Start: 2023-08-03 | End: 2023-08-03

## 2023-08-03 RX ORDER — ONDANSETRON 4 MG/1
4 TABLET, ORALLY DISINTEGRATING ORAL EVERY 8 HOURS PRN
Qty: 15 TABLET | Refills: 1 | Status: SHIPPED | OUTPATIENT
Start: 2023-08-03

## 2023-08-03 RX ORDER — ESCITALOPRAM OXALATE 20 MG/1
20 TABLET ORAL EVERY MORNING
COMMUNITY

## 2023-08-03 RX ORDER — CYANOCOBALAMIN 1000 UG/ML
1 INJECTION, SOLUTION INTRAMUSCULAR; SUBCUTANEOUS
COMMUNITY

## 2023-08-03 RX ORDER — BUPROPION HYDROCHLORIDE 150 MG/1
300 TABLET ORAL DAILY
COMMUNITY

## 2023-08-03 RX ORDER — DIPHENOXYLATE HCL/ATROPINE 2.5-.025MG
1 TABLET ORAL 4 TIMES DAILY PRN
Qty: 10 TABLET | Refills: 0 | Status: SHIPPED | OUTPATIENT
Start: 2023-08-03

## 2023-08-03 RX ADMIN — SODIUM CHLORIDE, POTASSIUM CHLORIDE, SODIUM LACTATE AND CALCIUM CHLORIDE 1000 ML: 600; 310; 30; 20 INJECTION, SOLUTION INTRAVENOUS at 15:09

## 2023-08-03 RX ADMIN — ONDANSETRON 4 MG: 2 INJECTION INTRAMUSCULAR; INTRAVENOUS at 12:15

## 2023-08-03 RX ADMIN — SODIUM CHLORIDE, POTASSIUM CHLORIDE, SODIUM LACTATE AND CALCIUM CHLORIDE 1000 ML: 600; 310; 30; 20 INJECTION, SOLUTION INTRAVENOUS at 12:12

## 2023-08-03 RX ADMIN — SODIUM CHLORIDE, POTASSIUM CHLORIDE, SODIUM LACTATE AND CALCIUM CHLORIDE 1000 ML: 600; 310; 30; 20 INJECTION, SOLUTION INTRAVENOUS at 13:49

## 2023-08-03 RX ADMIN — METOCLOPRAMIDE HYDROCHLORIDE 10 MG: 5 INJECTION INTRAMUSCULAR; INTRAVENOUS at 13:49

## 2023-08-03 ASSESSMENT — ACTIVITIES OF DAILY LIVING (ADL)
ADLS_ACUITY_SCORE: 35
ADLS_ACUITY_SCORE: 35

## 2023-08-03 NOTE — ED TRIAGE NOTES
Pt here with nausea/vomiting/diarrhea x3 days. Pt finished abx for GI infection about 2 weeks ago.      Triage Assessment       Row Name 08/03/23 1202       Triage Assessment (Adult)    Airway WDL WDL       Respiratory WDL    Respiratory WDL WDL       Skin Circulation/Temperature WDL    Skin Circulation/Temperature WDL WDL       Cardiac WDL    Cardiac WDL WDL       Peripheral/Neurovascular WDL    Peripheral Neurovascular WDL WDL       Cognitive/Neuro/Behavioral WDL    Cognitive/Neuro/Behavioral WDL WDL

## 2023-08-03 NOTE — ED PROVIDER NOTES
"  History     Chief Complaint   Patient presents with     Nausea, Vomiting, & Diarrhea     HPI   History per patient, review of [facility] EMR and Care Everywhere EMR.  Gayle Castelan is a 47 year old female with history of Jude-en-Y gastric bypass who presents to the emergency department for nausea, vomiting and diarrhea with concern for dehydration.  Diarrhea began 4 days ago, 7-10 episodes today.  No blood or mucus in her stools or emesis.  No fever or chills.  She has mild generalized abdominal soreness but no localized or significant abdominal pain.  Vomiting began 2 days ago.  No relief of diarrhea with use of OTC Imodium.  Recent history is remarkable for unknown antibiotic therapy for \"an overgrowth of bacteria in my stomach\" diagnosed by a breath test ordered by her [facility] gastroenterologist, completed antibiotic therapy approximately ~ 1.5-2 weeks ago.  The unknown antibiotic was later determined by her and her father to be Bactrim DS, taken for 10 days.  I was unable to find this antibiotic in review of the EMR.  No known infectious exposures or recent suspicious bad food ingestion.  No significant recent travel, she recently went to [place] but was not camping and had no other pertinent history regarding this travel.  No history of inflammatory bowel disease.  No other pertinent history or acute complaints or concerns.      Allergies:  Allergies   Allergen Reactions     Promethazine      Other reaction(s): Dystonia     Codeine Sulfate Nausea       Problem List:    Patient Active Problem List    Diagnosis Date Noted     Intractable chronic migraine without aura and without status migrainosus 08/10/2016     Priority: Medium     Encounter for long-term opiate analgesic use 08/10/2016     Priority: Medium     CARDIOVASCULAR SCREENING; LDL GOAL LESS THAN 160 10/31/2010     Priority: Medium     Bleeding unrelated to menstrual cycle 05/29/2009     Priority: Medium     Presence of other contraceptive device " "05/21/2009     Priority: Medium     Essure coil placement    Follow up HSG shows bilateral tubal occlusion.          Past Medical History:    No past medical history on file.    Past Surgical History:    Past Surgical History:   Procedure Laterality Date     ABDOMEN SURGERY       COSMETIC MAMMOPLASTY AUGMENTATION BILATERAL       PROCEDURE OTHER - HIM SCAN      ESURE Coil     wisdom teeth         Family History:    Family History   Adopted: Yes   Family history unknown: Yes       Social History:  Marital Status:  Single [1]  Social History     Tobacco Use     Smoking status: Never     Smokeless tobacco: Never   Substance Use Topics     Alcohol use: No     Drug use: No        Medications:    diphenoxylate-atropine (LOMOTIL) 2.5-0.025 MG tablet  hydrOXYzine (VISTARIL) 25 MG capsule  ondansetron (ZOFRAN ODT) 4 MG ODT tab  albuterol (ALBUTEROL) 108 (90 BASE) MCG/ACT inhaler  buPROPion (WELLBUTRIN XL) 150 MG 24 hr tablet  Citalopram Hydrobromide (CELEXA PO)  cyanocobalamin (CYANOCOBALAMIN) 1000 MCG/ML injection  escitalopram (LEXAPRO) 20 MG tablet  gabapentin (NEURONTIN) 300 MG capsule  HYDROcodone-acetaminophen (NORCO) 5-325 MG per tablet  LORazepam (ATIVAN) 1 MG tablet  meloxicam (MOBIC) 15 MG tablet  Multiple Vitamins-Minerals (MULTIVITAMIN ADULT PO)  Ondansetron HCl (ZOFRAN PO)  topiramate (TOPAMAX) 100 MG tablet      Review of Systems  As mentioned in the HPI, in addition focused review of systems was negative.    Physical Exam   BP: (!) 67/48  Pulse: 109  Temp: 98  F (36.7  C)  Resp: 18  Height: 160 cm (5' 3\")  Weight: 59 kg (130 lb)  SpO2: 95 %      Physical Exam  Vitals and nursing note reviewed.   Constitutional:       General: She is not in acute distress.     Appearance: Normal appearance. She is well-developed. She is not ill-appearing or diaphoretic.   HENT:      Head: Normocephalic and atraumatic.      Mouth/Throat:      Mouth: Mucous membranes are dry.   Eyes:      General: No scleral icterus.     " Extraocular Movements: Extraocular movements intact.      Conjunctiva/sclera: Conjunctivae normal.   Neck:      Trachea: No tracheal deviation.   Cardiovascular:      Rate and Rhythm: Normal rate and regular rhythm.      Heart sounds: Normal heart sounds. No murmur heard.     No friction rub. No gallop.   Pulmonary:      Effort: Pulmonary effort is normal. No respiratory distress.      Breath sounds: Normal breath sounds. No wheezing, rhonchi or rales.   Abdominal:      General: There is no distension.      Palpations: Abdomen is soft. There is no mass.      Tenderness: There is no abdominal tenderness. There is no guarding or rebound.      Hernia: No hernia is present.   Musculoskeletal:         General: No swelling or tenderness. Normal range of motion.      Cervical back: Normal range of motion and neck supple.      Right lower leg: No edema.      Left lower leg: No edema.   Skin:     General: Skin is warm and dry.      Coloration: Skin is not pale.      Findings: No erythema or rash.   Neurological:      Mental Status: She is alert.   Psychiatric:         Mood and Affect: Mood normal.         Behavior: Behavior normal.         ED Course           Procedures              Results for orders placed or performed during the hospital encounter of 08/03/23 (from the past 24 hour(s))   CBC with platelets, differential    Narrative    The following orders were created for panel order CBC with platelets, differential.  Procedure                               Abnormality         Status                     ---------                               -----------         ------                     CBC with platelets and d...[[id number]]  Abnormal            Final result                 Please view results for these tests on the individual orders.   Comprehensive metabolic panel   Result Value Ref Range    Sodium 142 136 - 145 mmol/L    Potassium 3.5 3.4 - 5.3 mmol/L    Chloride 107 98 - 107 mmol/L    Carbon Dioxide (CO2) 18 (L) 22  - 29 mmol/L    Anion Gap 17 (H) 7 - 15 mmol/L    Urea Nitrogen 9.9 6.0 - 20.0 mg/dL    Creatinine 1.10 (H) 0.51 - 0.95 mg/dL    Calcium 9.7 8.6 - 10.0 mg/dL    Glucose 111 (H) 70 - 99 mg/dL    Alkaline Phosphatase 90 35 - 104 U/L    AST 22 0 - 45 U/L    ALT 29 0 - 50 U/L    Protein Total 7.1 6.4 - 8.3 g/dL    Albumin 4.7 3.5 - 5.2 g/dL    Bilirubin Total 0.4 <=1.2 mg/dL    GFR Estimate 62 >60 mL/min/1.73m2   CBC with platelets and differential   Result Value Ref Range    WBC Count 8.8 4.0 - 11.0 10e3/uL    RBC Count 5.34 (H) 3.80 - 5.20 10e6/uL    Hemoglobin 17.2 (H) 11.7 - 15.7 g/dL    Hematocrit 51.1 (H) 35.0 - 47.0 %    MCV 96 78 - 100 fL    MCH 32.2 26.5 - 33.0 pg    MCHC 33.7 31.5 - 36.5 g/dL    RDW 13.0 10.0 - 15.0 %    Platelet Count 277 150 - 450 10e3/uL    % Neutrophils 80 %    % Lymphocytes 8 %    % Monocytes 9 %    % Eosinophils 2 %    % Basophils 1 %    % Immature Granulocytes 0 %    NRBCs per 100 WBC 0 <1 /100    Absolute Neutrophils 7.2 1.6 - 8.3 10e3/uL    Absolute Lymphocytes 0.7 (L) 0.8 - 5.3 10e3/uL    Absolute Monocytes 0.8 0.0 - 1.3 10e3/uL    Absolute Eosinophils 0.2 0.0 - 0.7 10e3/uL    Absolute Basophils 0.1 0.0 - 0.2 10e3/uL    Absolute Immature Granulocytes 0.0 <=0.4 10e3/uL    Absolute NRBCs 0.0 10e3/uL       Medications   lactated ringers BOLUS 1,000 mL (0 mLs Intravenous Stopped 8/3/23 1348)   ondansetron (ZOFRAN) injection 4 mg (4 mg Intravenous $Given 8/3/23 1215)   lactated ringers BOLUS 1,000 mL (0 mLs Intravenous Stopped 8/3/23 1508)   lactated ringers BOLUS 1,000 mL (0 mLs Intravenous Stopped 8/3/23 1627)   metoclopramide (REGLAN) 10 mg in sodium chloride 0.9 % 50 mL infusion (10 mg Intravenous $Given 8/3/23 1349)     Much improved after 2 L IV fluid bolus and Zofran, she is able to take ice chips without difficulty and without emesis.    She was unable to provide a stool specimen in the ED prior to discharge.    Assessments & Plan (with Medical Decision Making)   47 year old female  with history of Jude-en-Y gastric bypass who presents for 4 days of diarrhea and 2 days of nausea and vomiting with development of dehydration.  No signs or symptoms of GI bleeding.  No fever or chills.  She has mild generalized abdominal soreness but no localized or significant abdominal pain and abdomen is benign on exam.  No relief of diarrhea with use of OTC Imodium.  Recent history is remarkable for recent antibiotic use, Bactrim DS for 10 days, with therapy completed ~ 1.5-2 weeks ago.   She is afebrile with normal WBC, benign abdomen and improvement with IV fluids and antiemetic therapy. She was unable to provide a stool specimen in the ED and will be discharged with stool collection containers for stool evaluation.  Will Rx Zofran and Vistaril to use as needed for nausea and a small number of Lomotil to use if needed for diarrhea as she reports profuse watery diarrhea refractory to OTC Imodium.  I recommended primary care clinic recheck in 4 days, Monday/7/23 and she return as needed for worsening symptoms, recurrent vomiting, fever or any new problems or concerns.    I have reviewed the nursing notes.    I have reviewed the findings, diagnosis, plan and need for follow up with the patient.    Medical Decision Making: Moderate complexity  Hospitalization for observation, IV antiemetic therapy and continued supportive care was considered and deferred. She currently appears stable and appropriate for outpatient management with close f/u.        Discharge Medication List as of 8/3/2023  4:27 PM        START taking these medications    Details   diphenoxylate-atropine (LOMOTIL) 2.5-0.025 MG tablet Take 1 tablet by mouth 4 times daily as needed for diarrhea, Disp-10 tablet, R-0, E-Prescribe      hydrOXYzine (VISTARIL) 25 MG capsule Take 1-2 capsules (25-50 mg) by mouth 4 times daily as needed (Nausea), Disp-30 capsule, R-0, E-Prescribe      ondansetron (ZOFRAN ODT) 4 MG ODT tab Take 1 tablet (4 mg) by mouth every  8 hours as needed for nausea, Disp-15 tablet, R-1, E-Prescribe             Final diagnoses:   Nausea vomiting and diarrhea   Dehydration       8/3/2023   [facility] EMERGENCY DEPT       Angelo Garcia MD  08/06/23 0858

## 2024-01-28 ENCOUNTER — HEALTH MAINTENANCE LETTER (OUTPATIENT)
Age: 49
End: 2024-01-28

## 2024-03-06 ENCOUNTER — HOSPITAL ENCOUNTER (EMERGENCY)
Facility: CLINIC | Age: 49
Discharge: HOME | End: 2024-03-06
Attending: EMERGENCY MEDICINE | Admitting: EMERGENCY MEDICINE
Payer: COMMERCIAL

## 2024-03-06 ENCOUNTER — APPOINTMENT (OUTPATIENT)
Dept: GENERAL RADIOLOGY | Facility: CLINIC | Age: 49
End: 2024-03-06
Attending: EMERGENCY MEDICINE
Payer: COMMERCIAL

## 2024-03-06 VITALS
RESPIRATION RATE: 18 BRPM | SYSTOLIC BLOOD PRESSURE: 106 MMHG | OXYGEN SATURATION: 97 % | HEART RATE: 55 BPM | TEMPERATURE: 97.2 F | DIASTOLIC BLOOD PRESSURE: 79 MMHG

## 2024-03-06 DIAGNOSIS — R07.9 ACUTE CHEST PAIN: ICD-10-CM

## 2024-03-06 LAB
ANION GAP SERPL CALCULATED.3IONS-SCNC: 9 MMOL/L (ref 7–15)
BASOPHILS # BLD AUTO: 0.1 10E3/UL (ref 0–0.2)
BASOPHILS NFR BLD AUTO: 1 %
BUN SERPL-MCNC: 6.1 MG/DL (ref 6–20)
CALCIUM SERPL-MCNC: 8.5 MG/DL (ref 8.6–10)
CHLORIDE SERPL-SCNC: 97 MMOL/L (ref 98–107)
CREAT SERPL-MCNC: 0.76 MG/DL (ref 0.51–0.95)
DEPRECATED HCO3 PLAS-SCNC: 28 MMOL/L (ref 22–29)
EGFRCR SERPLBLD CKD-EPI 2021: >90 ML/MIN/1.73M2
EOSINOPHIL # BLD AUTO: 0.4 10E3/UL (ref 0–0.7)
EOSINOPHIL NFR BLD AUTO: 6 %
ERYTHROCYTE [DISTWIDTH] IN BLOOD BY AUTOMATED COUNT: 13 % (ref 10–15)
GLUCOSE SERPL-MCNC: 82 MG/DL (ref 70–99)
HCT VFR BLD AUTO: 39.8 % (ref 35–47)
HGB BLD-MCNC: 13.5 G/DL (ref 11.7–15.7)
HOLD SPECIMEN: NORMAL
IMM GRANULOCYTES # BLD: 0 10E3/UL
IMM GRANULOCYTES NFR BLD: 1 %
LYMPHOCYTES # BLD AUTO: 2 10E3/UL (ref 0–5.3)
LYMPHOCYTES NFR BLD AUTO: 31 %
MCH RBC QN AUTO: 32.5 PG (ref 26.5–33)
MCHC RBC AUTO-ENTMCNC: 33.9 G/DL (ref 31.5–36.5)
MCV RBC AUTO: 96 FL (ref 78–100)
MONOCYTES # BLD AUTO: 0.6 10E3/UL (ref 0–1.3)
MONOCYTES NFR BLD AUTO: 8 %
NEUTROPHILS # BLD AUTO: 3.4 10E3/UL (ref 1.6–8.3)
NEUTROPHILS NFR BLD AUTO: 53 %
NRBC # BLD AUTO: 0 10E3/UL
NRBC BLD AUTO-RTO: 0 /100
PLATELET # BLD AUTO: 238 10E3/UL (ref 150–450)
POTASSIUM SERPL-SCNC: 4.2 MMOL/L (ref 3.4–5.3)
RBC # BLD AUTO: 4.15 10E6/UL (ref 3.8–5.2)
SODIUM SERPL-SCNC: 134 MMOL/L (ref 135–145)
TROPONIN T SERPL HS-MCNC: <6 NG/L
WBC # BLD AUTO: 6.5 10E3/UL (ref 4–11)

## 2024-03-06 PROCEDURE — 80048 BASIC METABOLIC PNL TOTAL CA: CPT | Performed by: EMERGENCY MEDICINE

## 2024-03-06 PROCEDURE — 85025 COMPLETE CBC W/AUTO DIFF WBC: CPT | Performed by: EMERGENCY MEDICINE

## 2024-03-06 PROCEDURE — 99284 EMERGENCY DEPT VISIT MOD MDM: CPT | Mod: 25 | Performed by: EMERGENCY MEDICINE

## 2024-03-06 PROCEDURE — 93005 ELECTROCARDIOGRAM TRACING: CPT | Performed by: EMERGENCY MEDICINE

## 2024-03-06 PROCEDURE — 99285 EMERGENCY DEPT VISIT HI MDM: CPT | Mod: 25 | Performed by: EMERGENCY MEDICINE

## 2024-03-06 PROCEDURE — 250N000013 HC RX MED GY IP 250 OP 250 PS 637: Performed by: EMERGENCY MEDICINE

## 2024-03-06 PROCEDURE — 36415 COLL VENOUS BLD VENIPUNCTURE: CPT | Performed by: EMERGENCY MEDICINE

## 2024-03-06 PROCEDURE — 93010 ELECTROCARDIOGRAM REPORT: CPT | Performed by: EMERGENCY MEDICINE

## 2024-03-06 PROCEDURE — 250N000011 HC RX IP 250 OP 636: Performed by: EMERGENCY MEDICINE

## 2024-03-06 PROCEDURE — 71046 X-RAY EXAM CHEST 2 VIEWS: CPT

## 2024-03-06 PROCEDURE — 96374 THER/PROPH/DIAG INJ IV PUSH: CPT | Performed by: EMERGENCY MEDICINE

## 2024-03-06 PROCEDURE — 84484 ASSAY OF TROPONIN QUANT: CPT | Performed by: EMERGENCY MEDICINE

## 2024-03-06 RX ORDER — NITROGLYCERIN 0.4 MG/1
0.4 TABLET SUBLINGUAL EVERY 5 MIN PRN
Status: DISCONTINUED | OUTPATIENT
Start: 2024-03-06 | End: 2024-03-06 | Stop reason: HOSPADM

## 2024-03-06 RX ORDER — OLANZAPINE 10 MG/1
2.5 INJECTION, POWDER, LYOPHILIZED, FOR SOLUTION INTRAMUSCULAR ONCE
Status: COMPLETED | OUTPATIENT
Start: 2024-03-06 | End: 2024-03-06

## 2024-03-06 RX ORDER — ASPIRIN 81 MG/1
324 TABLET, CHEWABLE ORAL ONCE
Status: COMPLETED | OUTPATIENT
Start: 2024-03-06 | End: 2024-03-06

## 2024-03-06 RX ADMIN — ASPIRIN 81 MG CHEWABLE TABLET 324 MG: 81 TABLET CHEWABLE at 17:37

## 2024-03-06 RX ADMIN — OLANZAPINE 2.5 MG: 10 INJECTION, POWDER, FOR SOLUTION INTRAMUSCULAR at 18:17

## 2024-03-06 ASSESSMENT — COLUMBIA-SUICIDE SEVERITY RATING SCALE - C-SSRS
1. IN THE PAST MONTH, HAVE YOU WISHED YOU WERE DEAD OR WISHED YOU COULD GO TO SLEEP AND NOT WAKE UP?: NO
2. HAVE YOU ACTUALLY HAD ANY THOUGHTS OF KILLING YOURSELF IN THE PAST MONTH?: NO
6. HAVE YOU EVER DONE ANYTHING, STARTED TO DO ANYTHING, OR PREPARED TO DO ANYTHING TO END YOUR LIFE?: NO

## 2024-03-06 ASSESSMENT — ACTIVITIES OF DAILY LIVING (ADL)
ADLS_ACUITY_SCORE: 33
ADLS_ACUITY_SCORE: 35

## 2024-03-06 NOTE — ED PROVIDER NOTES
History     Chief Complaint   Patient presents with    Chest Pain    Altered Mental Status     HPI  Gayle Castelan is a 48 year old female who presents for chest pain.  Symptoms started about 2 hours prior to her arrival and while she was out shopping.  She feels pressure in the center of her chest radiating to the left and right going up into her jaw bilaterally.  She feels short of breath.  She has not taking thing for her symptoms.  No nausea, vomiting, fever, chills, headache, diarrhea, abdominal pain.  No cough or hemoptysis.  No lower extremity pain or swelling.  She is also feeling slightly off, she says she has trouble concentrating, says it takes extra concentration to hold a conversation or focus on words.  She says that she can still read but it just is hard to concentrate on what she is trying to read.  No changes in her speech.  No double vision or difficulty swallowing.  No focal numbness or weakness.    I reviewed the patient's neurology visit from 12/14/2023 for follow-up of longstanding history of headaches.  She was on multiple different medications to try to help decrease her chronic migraines.    Allergies:  Allergies   Allergen Reactions    Promethazine      Other reaction(s): Dystonia    Codeine Sulfate Nausea       Problem List:    Patient Active Problem List    Diagnosis Date Noted    Intractable chronic migraine without aura and without status migrainosus 08/10/2016     Priority: Medium    Encounter for long-term opiate analgesic use 08/10/2016     Priority: Medium    CARDIOVASCULAR SCREENING; LDL GOAL LESS THAN 160 10/31/2010     Priority: Medium    Bleeding unrelated to menstrual cycle 05/29/2009     Priority: Medium    Presence of other contraceptive device 05/21/2009     Priority: Medium     Essure coil placement    Follow up HSG shows bilateral tubal occlusion.          Past Medical History:    No past medical history on file.    Past Surgical History:    Past Surgical History:    Procedure Laterality Date    ABDOMEN SURGERY      COSMETIC MAMMOPLASTY AUGMENTATION BILATERAL      PROCEDURE OTHER - HIM SCAN      ESURE Coil    wisdom teeth         Family History:    Family History   Adopted: Yes   Family history unknown: Yes       Social History:  Marital Status:  Single [1]  Social History     Tobacco Use    Smoking status: Never    Smokeless tobacco: Never   Substance Use Topics    Alcohol use: No    Drug use: No        Medications:    albuterol (ALBUTEROL) 108 (90 BASE) MCG/ACT inhaler  buPROPion (WELLBUTRIN XL) 150 MG 24 hr tablet  Citalopram Hydrobromide (CELEXA PO)  cyanocobalamin (CYANOCOBALAMIN) 1000 MCG/ML injection  diphenoxylate-atropine (LOMOTIL) 2.5-0.025 MG tablet  escitalopram (LEXAPRO) 20 MG tablet  gabapentin (NEURONTIN) 300 MG capsule  HYDROcodone-acetaminophen (NORCO) 5-325 MG per tablet  hydrOXYzine (VISTARIL) 25 MG capsule  LORazepam (ATIVAN) 1 MG tablet  meloxicam (MOBIC) 15 MG tablet  Multiple Vitamins-Minerals (MULTIVITAMIN ADULT PO)  ondansetron (ZOFRAN ODT) 4 MG ODT tab  Ondansetron HCl (ZOFRAN PO)  topiramate (TOPAMAX) 100 MG tablet          Review of Systems    Physical Exam   BP: (!) 146/89  Pulse: 60  Temp: 97.2  F (36.2  C)  Resp: 18  SpO2: 100 %      Physical Exam  Vitals and nursing note reviewed.   Constitutional:       Appearance: She is well-developed. She is not diaphoretic.   HENT:      Head: Normocephalic and atraumatic.      Right Ear: External ear normal.      Left Ear: External ear normal.      Nose: Nose normal.   Eyes:      General: No scleral icterus.     Conjunctiva/sclera: Conjunctivae normal.   Cardiovascular:      Rate and Rhythm: Normal rate and regular rhythm.      Heart sounds: No murmur heard.  Pulmonary:      Effort: Pulmonary effort is normal. No respiratory distress.      Breath sounds: No stridor.   Abdominal:      General: There is no distension.      Palpations: Abdomen is soft.   Musculoskeletal:      Cervical back: Normal range of  motion.      Right lower leg: No edema.      Left lower leg: No edema.   Skin:     General: Skin is warm and dry.   Neurological:      Mental Status: She is alert.      Cranial Nerves: Cranial nerves 2-12 are intact.      Motor: No abnormal muscle tone or pronator drift.      Coordination: Finger-Nose-Finger Test normal. Rapid alternating movements normal.   Psychiatric:         Behavior: Behavior normal.         ED Course        Procedures              EKG Interpretation:      Interpreted by Raghav Chance MD  Time reviewed: 1700  Symptoms at time of EKG: chest pain   Rhythm: normal sinus   Rate: normal  Axis: normal  Ectopy: none  Conduction: normal  ST Segments/ T Waves: No ST-T wave changes  Q Waves: none  Comparison to prior: No old EKG available    Clinical Impression: normal EKG      Critical Care time:  none               Results for orders placed or performed during the hospital encounter of 03/06/24 (from the past 24 hour(s))   [place] Draw    Narrative    The following orders were created for panel order [place] Draw.  Procedure                               Abnormality         Status                     ---------                               -----------         ------                     Extra Blue Top Tube[[id number]]                              Final result               Extra Green Top (Lithium...[[id number]]                      Final result               Extra Purple Top Tube[[id number]]                            Final result                 Please view results for these tests on the individual orders.   Extra Blue Top Tube   Result Value Ref Range    Hold Specimen JIC    Extra Green Top (Lithium Heparin) Tube   Result Value Ref Range    Hold Specimen JIC    Extra Purple Top Tube   Result Value Ref Range    Hold Specimen JIC    Troponin T, High Sensitivity   Result Value Ref Range    Troponin T, High Sensitivity <6 <=14 ng/L   Basic metabolic panel   Result Value Ref Range    Sodium 134 (L)  135 - 145 mmol/L    Potassium 4.2 3.4 - 5.3 mmol/L    Chloride 97 (L) 98 - 107 mmol/L    Carbon Dioxide (CO2) 28 22 - 29 mmol/L    Anion Gap 9 7 - 15 mmol/L    Urea Nitrogen 6.1 6.0 - 20.0 mg/dL    Creatinine 0.76 0.51 - 0.95 mg/dL    GFR Estimate >90 >60 mL/min/1.73m2    Calcium 8.5 (L) 8.6 - 10.0 mg/dL    Glucose 82 70 - 99 mg/dL   CBC with Platelets & Differential    Narrative    The following orders were created for panel order CBC with Platelets & Differential.  Procedure                               Abnormality         Status                     ---------                               -----------         ------                     CBC with platelets and d...[[id number]]                      Final result                 Please view results for these tests on the individual orders.   CBC with platelets and differential   Result Value Ref Range    WBC Count 6.5 4.0 - 11.0 10e3/uL    RBC Count 4.15 3.80 - 5.20 10e6/uL    Hemoglobin 13.5 11.7 - 15.7 g/dL    Hematocrit 39.8 35.0 - 47.0 %    MCV 96 78 - 100 fL    MCH 32.5 26.5 - 33.0 pg    MCHC 33.9 31.5 - 36.5 g/dL    RDW 13.0 10.0 - 15.0 %    Platelet Count 238 150 - 450 10e3/uL    % Neutrophils 53 %    % Lymphocytes 31 %    % Monocytes 8 %    % Eosinophils 6 %    % Basophils 1 %    % Immature Granulocytes 1 %    NRBCs per 100 WBC 0 <1 /100    Absolute Neutrophils 3.4 1.6 - 8.3 10e3/uL    Absolute Lymphocytes 2.0 0.0 - 5.3 10e3/uL    Absolute Monocytes 0.6 0.0 - 1.3 10e3/uL    Absolute Eosinophils 0.4 0.0 - 0.7 10e3/uL    Absolute Basophils 0.1 0.0 - 0.2 10e3/uL    Absolute Immature Granulocytes 0.0 <=0.4 10e3/uL    Absolute NRBCs 0.0 10e3/uL   XR Chest 2 Views    Narrative    EXAM: XR CHEST 2 VIEWS  LOCATION: [facility]  DATE: 3/6/2024    INDICATION: Chest pain and shortness of breath  COMPARISON: None.      Impression    IMPRESSION: Negative chest.       Medications   nitroGLYcerin (NITROSTAT) sublingual tablet 0.4 mg (has no  administration in time range)   aspirin (ASA) chewable tablet 324 mg (324 mg Oral $Given 3/6/24 1737)   OLANZapine (zyPREXA) IV injection 2.5 mg (2.5 mg Intravenous $Given 3/6/24 1817)       Assessments & Plan (with Medical Decision Making)   48-year-old female who presents for chest pain, shortness of breath, difficulty concentrating.  Vital signs are reassuring.  EKG is sinus rhythm without signs of acute ischemia or dysrhythmia.  Troponin is undetectable which makes ACS unlikely.  She has a normal neurologic examination, unlikely stroke.  Chest x-ray obtained, images interpreted independently as well as radiology read reviewed, no signs of infiltrate to suggest pneumonia, no signs of pneumothorax.  The rest of the blood tests are reassuring as above.  She has a history of migraines and taps this is an atypical migraine for her.  Will try IV olanzapine and see if this will help.  On recheck the patient says that she is feeling better.  At this time she is safe to discharge home and she feels comfortable going home.  She is told to return if she has worsening symptoms or other concerns, otherwise follow-up in clinic.  The patient is in agreement with this plan.    I have reviewed the nursing notes.    I have reviewed the findings, diagnosis, plan and need for follow up with the patient.         New Prescriptions    No medications on file       Final diagnoses:   Acute chest pain       3/6/2024   [facility] EMERGENCY DEPT       Raghav Chance MD  03/06/24 5325

## 2024-03-06 NOTE — ED TRIAGE NOTES
Patient states she started feeling confused while shopping. Pt states now she is unable to read. No blurred vision. VSS in triage.      Triage Assessment (Adult)       Row Name 03/06/24 1052          Triage Assessment    Airway WDL WDL        Respiratory WDL    Respiratory WDL WDL        Skin Circulation/Temperature WDL    Skin Circulation/Temperature WDL WDL        Cardiac WDL    Cardiac WDL WDL        Peripheral/Neurovascular WDL    Peripheral Neurovascular WDL WDL        Cognitive/Neuro/Behavioral WDL    Cognitive/Neuro/Behavioral WDL WDL

## 2024-03-07 NOTE — DISCHARGE INSTRUCTIONS
Not sure what caused your pain but I am glad you are feeling better.  Maybe this was an atypical headache for you.  Drink plenty of fluids, get some rest, return if you are having worsening symptoms or other concerns.  Otherwise follow-up in clinic.

## 2024-06-16 ENCOUNTER — HEALTH MAINTENANCE LETTER (OUTPATIENT)
Age: 49
End: 2024-06-16

## 2025-05-31 ENCOUNTER — HEALTH MAINTENANCE LETTER (OUTPATIENT)
Age: 50
End: 2025-05-31

## 2025-06-21 ENCOUNTER — HEALTH MAINTENANCE LETTER (OUTPATIENT)
Age: 50
End: 2025-06-21